# Patient Record
Sex: FEMALE | Race: WHITE | Employment: FULL TIME | URBAN - METROPOLITAN AREA
[De-identification: names, ages, dates, MRNs, and addresses within clinical notes are randomized per-mention and may not be internally consistent; named-entity substitution may affect disease eponyms.]

---

## 2017-01-26 ENCOUNTER — HOSPITAL ENCOUNTER (OUTPATIENT)
Dept: LAB | Age: 47
Discharge: HOME | End: 2017-01-26

## 2017-01-26 PROCEDURE — 88305 TISSUE EXAM BY PATHOLOGIST: CPT | Performed by: INTERNAL MEDICINE

## 2017-01-26 PROCEDURE — 88312 SPECIAL STAINS GROUP 1: CPT | Performed by: INTERNAL MEDICINE

## 2017-03-07 ENCOUNTER — HOSPITAL ENCOUNTER (OUTPATIENT)
Dept: CT IMAGING | Age: 47
Discharge: HOME | End: 2017-03-07
Attending: INTERNAL MEDICINE
Payer: COMMERCIAL

## 2017-03-07 DIAGNOSIS — R10.31 RLQ ABDOMINAL PAIN: ICD-10-CM

## 2017-03-07 PROCEDURE — 74011636320 HC RX REV CODE- 636/320: Performed by: INTERNAL MEDICINE

## 2017-03-07 PROCEDURE — 74011000258 HC RX REV CODE- 258: Performed by: INTERNAL MEDICINE

## 2017-03-07 PROCEDURE — 74177 CT ABD & PELVIS W/CONTRAST: CPT

## 2017-03-07 RX ORDER — SODIUM CHLORIDE 0.9 % (FLUSH) 0.9 %
10 SYRINGE (ML) INJECTION
Status: COMPLETED | OUTPATIENT
Start: 2017-03-07 | End: 2017-03-07

## 2017-03-07 RX ADMIN — IOVERSOL 100 ML: 741 INJECTION INTRA-ARTERIAL; INTRAVENOUS at 09:27

## 2017-03-07 RX ADMIN — SODIUM CHLORIDE 100 ML: 900 INJECTION, SOLUTION INTRAVENOUS at 09:28

## 2017-03-07 RX ADMIN — Medication 10 ML: at 09:28

## 2017-03-07 RX ADMIN — DIATRIZOATE MEGLUMINE AND DIATRIZOATE SODIUM 15 ML: 660; 100 LIQUID ORAL; RECTAL at 09:27

## 2017-07-21 ENCOUNTER — APPOINTMENT (OUTPATIENT)
Dept: CT IMAGING | Age: 47
End: 2017-07-21
Payer: COMMERCIAL

## 2017-07-21 ENCOUNTER — HOSPITAL ENCOUNTER (EMERGENCY)
Age: 47
Discharge: HOME | End: 2017-07-21
Attending: EMERGENCY MEDICINE
Payer: COMMERCIAL

## 2017-07-21 VITALS
SYSTOLIC BLOOD PRESSURE: 130 MMHG | DIASTOLIC BLOOD PRESSURE: 66 MMHG | HEIGHT: 65 IN | WEIGHT: 165 LBS | BODY MASS INDEX: 27.49 KG/M2 | TEMPERATURE: 98.5 F | HEART RATE: 65 BPM | OXYGEN SATURATION: 94 % | RESPIRATION RATE: 16 BRPM

## 2017-07-21 DIAGNOSIS — R51.9 NONINTRACTABLE HEADACHE, UNSPECIFIED CHRONICITY PATTERN, UNSPECIFIED HEADACHE TYPE: Primary | ICD-10-CM

## 2017-07-21 PROCEDURE — 99284 EMERGENCY DEPT VISIT MOD MDM: CPT | Performed by: PHYSICIAN ASSISTANT

## 2017-07-21 PROCEDURE — 74011250637 HC RX REV CODE- 250/637: Performed by: PHYSICIAN ASSISTANT

## 2017-07-21 PROCEDURE — 74011250636 HC RX REV CODE- 250/636: Performed by: PHYSICIAN ASSISTANT

## 2017-07-21 PROCEDURE — 70450 CT HEAD/BRAIN W/O DYE: CPT

## 2017-07-21 PROCEDURE — 96372 THER/PROPH/DIAG INJ SC/IM: CPT | Performed by: PHYSICIAN ASSISTANT

## 2017-07-21 RX ORDER — BUTALBITAL, ACETAMINOPHEN AND CAFFEINE 50; 325; 40 MG/1; MG/1; MG/1
1 TABLET ORAL
Status: COMPLETED | OUTPATIENT
Start: 2017-07-21 | End: 2017-07-21

## 2017-07-21 RX ORDER — ONDANSETRON 8 MG/1
8 TABLET, ORALLY DISINTEGRATING ORAL
Status: COMPLETED | OUTPATIENT
Start: 2017-07-21 | End: 2017-07-21

## 2017-07-21 RX ORDER — KETOROLAC TROMETHAMINE 30 MG/ML
60 INJECTION, SOLUTION INTRAMUSCULAR; INTRAVENOUS
Status: COMPLETED | OUTPATIENT
Start: 2017-07-21 | End: 2017-07-21

## 2017-07-21 RX ORDER — KETOROLAC TROMETHAMINE 30 MG/ML
60 INJECTION, SOLUTION INTRAMUSCULAR; INTRAVENOUS
Status: DISCONTINUED | OUTPATIENT
Start: 2017-07-21 | End: 2017-07-21 | Stop reason: HOSPADM

## 2017-07-21 RX ADMIN — BUTALBITAL, ACETAMINOPHEN AND CAFFEINE 1 TABLET: 50; 325; 40 TABLET ORAL at 13:58

## 2017-07-21 RX ADMIN — KETOROLAC TROMETHAMINE 60 MG: 30 INJECTION, SOLUTION INTRAMUSCULAR at 15:52

## 2017-07-21 RX ADMIN — ONDANSETRON 8 MG: 8 TABLET, ORALLY DISINTEGRATING ORAL at 13:59

## 2017-07-21 NOTE — LETTER
[address] [facility] EMERGENCY DEPT 
[facility] [address] [facility] [address] 
[phone] Work/School Note Date: 7/21/2017 To Whom It May concern: 
 
Redd Burciaga was seen and treated today in the emergency room by the following provider(s): 
Attending Provider: Fartun Carvajal MD 
Physician Assistant: WENDI Waite. Redd Burciaga may return to work on 7-22-17. Sincerely, WENDI Waite

## 2017-07-21 NOTE — ED PROVIDER NOTES
HPI Comments: Pt reports hedache started yesterday, no relief with advil, none taken today no feer, + nausea no vomiting or visual changes     Patient is a 48 y.o. female presenting with headaches. The history is provided by the patient. Headache    This is a new problem. The current episode started yesterday. The problem occurs constantly. The problem has not changed since onset. The headache is aggravated by an unknown factor. The pain is located in the left unilateral region. The quality of the pain is described as dull. The pain is at a severity of 6/10. The pain is mild. Associated symptoms include nausea. Pertinent negatives include no fever, no dizziness, no visual change and no vomiting. She has tried NSAIDs for the symptoms. The treatment provided no relief. Past Medical History:   Diagnosis Date    Endocrine disease     L thyroid goiter    Gastroesophageal reflux disease     Papillary carcinoma of thyroid ([facility] [address] 5/14/2013       Past Surgical History:   Procedure Laterality Date    HX HEENT      Thyroidectomy    HX WISDOM TEETH EXTRACTION           Family History:   Problem Relation Age of Onset    Heart Disease Father     Diabetes Father        Social History     Social History    Marital status: SINGLE     Spouse name: N/A    Number of children: N/A    Years of education: N/A     Occupational History    Not on file. Social History Main Topics    Smoking status: Never Smoker    Smokeless tobacco: Never Used    Alcohol use No    Drug use: No    Sexual activity: No     Other Topics Concern    Caffeine Concern Yes    Exercise Yes    Seat Belt Yes    Self-Exams Yes     Social History Narrative    Denies any sexual or physical abuse and feels safe at home. ALLERGIES: Bactrim [sulfamethoprim ds] and Sulfa (sulfonamide antibiotics)    Review of Systems   Constitutional: Negative for fever. Gastrointestinal: Positive for nausea. Negative for vomiting.    Neurological: Positive for headaches. Negative for dizziness. All other systems reviewed and are negative. Vitals:    07/21/17 1143   BP: 137/72   Pulse: 68   Resp: 16   Temp: 98.7 °F (37.1 °C)   SpO2: 97%   Weight: 74.8 kg (165 lb)   Height: 5' 5\" (1.651 m)            Physical Exam   Constitutional: She is oriented to person, place, and time. She appears well-developed and well-nourished. No distress. HENT:   Head: Normocephalic and atraumatic. Right Ear: External ear normal.   Left Ear: External ear normal.   Eyes: Conjunctivae and EOM are normal. Pupils are equal, round, and reactive to light. Neck: Normal range of motion. Neck supple. Cardiovascular: Normal rate and regular rhythm. Pulmonary/Chest: Effort normal and breath sounds normal. No respiratory distress. She has no wheezes. Abdominal: Soft. Bowel sounds are normal.   Musculoskeletal: Normal range of motion. She exhibits no edema. Neurological: She is alert and oriented to person, place, and time. She has normal reflexes. No cranial nerve deficit. No mennigeal signs    Skin: Skin is warm. Nursing note and vitals reviewed.        MDM  Number of Diagnoses or Management Options  Diagnosis management comments: Headache vs normal  Pt given fioricet and zofran   Pt walked to restroom w/o assistance then complained of increased headache  Will get head ct and recheck vss, still no neuro changes and pt appears comfortable   Head ct negative, will given toradol   Pt feeling better post toradol, stressed to follow up with pmd, work note given        Amount and/or Complexity of Data Reviewed  Tests in the radiology section of CPT®: ordered and reviewed  Review and summarize past medical records: yes    Risk of Complications, Morbidity, and/or Mortality  Presenting problems: moderate  Diagnostic procedures: moderate  Management options: low    Patient Progress  Patient progress: improved    ED Course       Procedures

## 2017-07-21 NOTE — ED TRIAGE NOTES
PMD-Dr Dhara Giles. Pt c/o headache x 2 days unrelieved with Advil. Denies any change with vision or speech. Neuro intact.

## 2017-07-21 NOTE — DISCHARGE INSTRUCTIONS

## 2017-10-24 ENCOUNTER — HOSPITAL ENCOUNTER (OUTPATIENT)
Dept: NUCLEAR MEDICINE | Age: 47
Discharge: HOME | End: 2017-10-24
Payer: COMMERCIAL

## 2017-10-24 VITALS — BODY MASS INDEX: 26.29 KG/M2 | HEIGHT: 64 IN | WEIGHT: 154 LBS

## 2017-10-24 DIAGNOSIS — R11.0 NAUSEA: ICD-10-CM

## 2017-10-24 DIAGNOSIS — R10.11 RUQ PAIN: ICD-10-CM

## 2017-10-24 PROCEDURE — 74011250636 HC RX REV CODE- 250/636

## 2017-10-24 PROCEDURE — 78227 HEPATOBIL SYST IMAGE W/DRUG: CPT

## 2017-10-24 RX ADMIN — SINCALIDE 1.4 MCG: 5 INJECTION, POWDER, LYOPHILIZED, FOR SOLUTION INTRAVENOUS at 11:11

## 2017-11-02 PROBLEM — R10.11 RIGHT UPPER QUADRANT ABDOMINAL PAIN: Status: ACTIVE | Noted: 2017-11-02

## 2017-11-09 ENCOUNTER — HOSPITAL ENCOUNTER (OUTPATIENT)
Dept: ULTRASOUND IMAGING | Age: 47
Discharge: HOME | End: 2017-11-09
Attending: SURGERY
Payer: COMMERCIAL

## 2017-11-09 DIAGNOSIS — R10.11 RIGHT UPPER QUADRANT ABDOMINAL PAIN: ICD-10-CM

## 2017-11-09 PROCEDURE — 76705 ECHO EXAM OF ABDOMEN: CPT

## 2018-07-26 PROBLEM — N93.9 MENSTRUAL BLEEDING PROBLEM: Status: ACTIVE | Noted: 2018-07-26

## 2018-07-26 PROBLEM — D25.1 INTRAMURAL, SUBMUCOUS, AND SUBSEROUS LEIOMYOMA OF UTERUS: Status: ACTIVE | Noted: 2018-07-26

## 2018-07-26 PROBLEM — D25.0 INTRAMURAL, SUBMUCOUS, AND SUBSEROUS LEIOMYOMA OF UTERUS: Status: ACTIVE | Noted: 2018-07-26

## 2018-07-26 PROBLEM — B37.31 YEAST VAGINITIS: Status: ACTIVE | Noted: 2018-07-26

## 2018-07-26 PROBLEM — D25.2 INTRAMURAL, SUBMUCOUS, AND SUBSEROUS LEIOMYOMA OF UTERUS: Status: ACTIVE | Noted: 2018-07-26

## 2018-07-26 PROBLEM — N93.9 ABNORMAL UTERINE BLEEDING: Status: ACTIVE | Noted: 2018-07-26

## 2018-07-26 PROCEDURE — 88305 TISSUE EXAM BY PATHOLOGIST: CPT | Performed by: OBSTETRICS & GYNECOLOGY

## 2018-07-27 ENCOUNTER — HOSPITAL ENCOUNTER (OUTPATIENT)
Dept: LAB | Age: 48
Discharge: HOME | End: 2018-07-27

## 2018-10-01 PROBLEM — N39.46 MIXED INCONTINENCE: Status: ACTIVE | Noted: 2018-10-01

## 2018-11-09 ENCOUNTER — HOSPITAL ENCOUNTER (OUTPATIENT)
Dept: SURGERY | Age: 48
Discharge: HOME | End: 2018-11-09

## 2018-11-15 VITALS — HEIGHT: 65 IN | BODY MASS INDEX: 24.83 KG/M2 | WEIGHT: 149 LBS

## 2018-11-15 NOTE — PERIOP NOTES
Patient verified name and     Order for consent not found in EHR. Pt states that Dr. Carlo Awad is not doing surgery any longer and only Dr. Debora Huynh will be doing a hysterectomy. Julita Later in scheduling notified that patient states  Carlo Awad is not doing surgery. Pt also states that she is having a total hysterectomy with bilateral salpingectomy and oophorectomy, surgery posting is for hysterectomy with salpingectomy. Dell Franco in surgery scheduling at Dr. Emery Chacko office notified that pt states she is having a hysterectomy with bilateral salpingectomy and oophorectomy. Type 2 surgery    Labs per surgeon: orders not received. Labs per anesthesia protocol: Hgb and Type & screen the day of surgery. EKG: None needed per anesthesia guidelines. Patient informed of GYN class on 18 at 11:00 AM at which time labs will be drawn. Patient will also receive all patient education and hibiclens soap to use per hospital policy. Patient instructed to hold all vitamins 7 days prior to surgery and NSAIDS 5 days prior to surgery, patient verbalized understanding. Patient instructed to continue previous medications as prescribed prior to surgery and to take the following medications the day of surgery according to anesthesia guidelines with a small sip of water: WP thyroid and zegrid if needed. Patient answered medical/surgical history questions at their best of ability. All prior to admission medications documented in [facility].

## 2018-11-16 ENCOUNTER — HOSPITAL ENCOUNTER (OUTPATIENT)
Dept: SURGERY | Age: 48
Discharge: HOME | End: 2018-11-16
Payer: COMMERCIAL

## 2018-11-16 LAB — HGB BLD-MCNC: 12.9 G/DL (ref 11.7–15.4)

## 2018-11-16 PROCEDURE — 85018 HEMOGLOBIN: CPT

## 2018-11-16 PROCEDURE — 36415 COLL VENOUS BLD VENIPUNCTURE: CPT

## 2018-11-16 NOTE — PERIOP NOTES
HGB done today WNL Recent Results (from the past 12 hour(s)) HEMOGLOBIN Collection Time: 11/16/18 11:55 AM  
Result Value Ref Range HGB 12.9 11.7 - 15.4 g/dL

## 2018-11-16 NOTE — PROGRESS NOTES
Patient attended GYN surgery orientation class today. Detailed instruction book regarding GYN surgery was provided at start of class. Class content included pre-operative instructions for surgery in the week prior to and day before surgery. Packet including Hibiclens and instructions on bathing was provided to patient. Detailed information was given regarding arriving at the hospital and instructions for the patient's day of surgery. Discussed recovery from surgery, hospital stay, pain management, and discharge. Reviewed recovery at home including pelvic rest, driving and activity restrictions, issues requiring call to physician etc. Freda Stephen all questions in detail. Patient voices understanding of all.

## 2018-11-26 ENCOUNTER — ANESTHESIA EVENT (OUTPATIENT)
Dept: SURGERY | Age: 48
End: 2018-11-26
Payer: COMMERCIAL

## 2018-11-27 ENCOUNTER — ANESTHESIA (OUTPATIENT)
Dept: SURGERY | Age: 48
End: 2018-11-27
Payer: COMMERCIAL

## 2018-11-27 ENCOUNTER — HOSPITAL ENCOUNTER (OUTPATIENT)
Age: 48
Setting detail: OBSERVATION
Discharge: HOME | End: 2018-11-28
Attending: OBSTETRICS & GYNECOLOGY | Admitting: OBSTETRICS & GYNECOLOGY
Payer: COMMERCIAL

## 2018-11-27 DIAGNOSIS — G89.18 POSTOPERATIVE PAIN: ICD-10-CM

## 2018-11-27 DIAGNOSIS — N93.9 ABNORMAL UTERINE BLEEDING: Primary | ICD-10-CM

## 2018-11-27 LAB
ABO + RH BLD: NORMAL
BLOOD GROUP ANTIBODIES SERPL: NORMAL
HCG UR QL: NEGATIVE
SPECIMEN EXP DATE BLD: NORMAL

## 2018-11-27 PROCEDURE — 77030020255 HC SOL INJ LR 1000ML BG: Performed by: OBSTETRICS & GYNECOLOGY

## 2018-11-27 PROCEDURE — 81025 URINE PREGNANCY TEST: CPT

## 2018-11-27 PROCEDURE — 74011250636 HC RX REV CODE- 250/636

## 2018-11-27 PROCEDURE — 36415 COLL VENOUS BLD VENIPUNCTURE: CPT

## 2018-11-27 PROCEDURE — 99218 HC RM OBSERVATION: CPT

## 2018-11-27 PROCEDURE — 77030008756 HC TU IRR SUC STRY -B: Performed by: OBSTETRICS & GYNECOLOGY

## 2018-11-27 PROCEDURE — 74011250637 HC RX REV CODE- 250/637: Performed by: ANESTHESIOLOGY

## 2018-11-27 PROCEDURE — 77030016151 HC PROTCTR LNS DFOG COVD -B: Performed by: OBSTETRICS & GYNECOLOGY

## 2018-11-27 PROCEDURE — 76060000034 HC ANESTHESIA 1.5 TO 2 HR: Performed by: OBSTETRICS & GYNECOLOGY

## 2018-11-27 PROCEDURE — 77030031139 HC SUT VCRL2 J&J -A: Performed by: OBSTETRICS & GYNECOLOGY

## 2018-11-27 PROCEDURE — 74011250636 HC RX REV CODE- 250/636: Performed by: OBSTETRICS & GYNECOLOGY

## 2018-11-27 PROCEDURE — 76010000875 HC OR TIME 1.5 TO 2HR INTENSV - TIER 2: Performed by: OBSTETRICS & GYNECOLOGY

## 2018-11-27 PROCEDURE — 77030037088 HC TUBE ENDOTRACH ORAL NSL COVD-A: Performed by: ANESTHESIOLOGY

## 2018-11-27 PROCEDURE — 74011000250 HC RX REV CODE- 250: Performed by: OBSTETRICS & GYNECOLOGY

## 2018-11-27 PROCEDURE — 77030039425 HC BLD LARYNG TRULITE DISP TELE -A: Performed by: ANESTHESIOLOGY

## 2018-11-27 PROCEDURE — 74011250636 HC RX REV CODE- 250/636: Performed by: ANESTHESIOLOGY

## 2018-11-27 PROCEDURE — 77030018846 HC SOL IRR STRL H20 ICUM -A: Performed by: OBSTETRICS & GYNECOLOGY

## 2018-11-27 PROCEDURE — 77030031492 HC PRT ACC BLNT AIRSEAL CNMD -B: Performed by: OBSTETRICS & GYNECOLOGY

## 2018-11-27 PROCEDURE — 77030010517 HC APPL SEAL FLOSEL BAXT -B: Performed by: OBSTETRICS & GYNECOLOGY

## 2018-11-27 PROCEDURE — 77030034696 HC CATH URETH FOL 2W BARD -A: Performed by: OBSTETRICS & GYNECOLOGY

## 2018-11-27 PROCEDURE — 86901 BLOOD TYPING SEROLOGIC RH(D): CPT

## 2018-11-27 PROCEDURE — 74011000250 HC RX REV CODE- 250

## 2018-11-27 PROCEDURE — 76210000006 HC OR PH I REC 0.5 TO 1 HR: Performed by: OBSTETRICS & GYNECOLOGY

## 2018-11-27 PROCEDURE — 88307 TISSUE EXAM BY PATHOLOGIST: CPT

## 2018-11-27 PROCEDURE — 77030035044 HC TRCR ENDOSC VRSPRT BLDLSS COVD -C: Performed by: OBSTETRICS & GYNECOLOGY

## 2018-11-27 PROCEDURE — 77030035277 HC OBTRTR BLDELSS DISP INTU -B: Performed by: OBSTETRICS & GYNECOLOGY

## 2018-11-27 PROCEDURE — 74011250637 HC RX REV CODE- 250/637: Performed by: OBSTETRICS & GYNECOLOGY

## 2018-11-27 PROCEDURE — 77030008771 HC TU NG SALEM SUMP -A: Performed by: ANESTHESIOLOGY

## 2018-11-27 PROCEDURE — 77030010545: Performed by: OBSTETRICS & GYNECOLOGY

## 2018-11-27 PROCEDURE — 77030018778 HC MANIP UTER VCAR CNMD -B: Performed by: OBSTETRICS & GYNECOLOGY

## 2018-11-27 PROCEDURE — 77030020782 HC GWN BAIR PAWS FLX 3M -B: Performed by: ANESTHESIOLOGY

## 2018-11-27 PROCEDURE — 77030014650 HC SEAL MTRX FLOSEL BAXT -C: Performed by: OBSTETRICS & GYNECOLOGY

## 2018-11-27 PROCEDURE — 77030035029 HC NDL INSUF VERES DISP COVD -B: Performed by: OBSTETRICS & GYNECOLOGY

## 2018-11-27 PROCEDURE — 77030032490 HC SLV COMPR SCD KNE COVD -B: Performed by: OBSTETRICS & GYNECOLOGY

## 2018-11-27 RX ORDER — SODIUM CHLORIDE 0.9 % (FLUSH) 0.9 %
5-10 SYRINGE (ML) INJECTION EVERY 8 HOURS
Status: DISCONTINUED | OUTPATIENT
Start: 2018-11-27 | End: 2018-11-28 | Stop reason: HOSPADM

## 2018-11-27 RX ORDER — FENTANYL CITRATE 50 UG/ML
INJECTION, SOLUTION INTRAMUSCULAR; INTRAVENOUS AS NEEDED
Status: DISCONTINUED | OUTPATIENT
Start: 2018-11-27 | End: 2018-11-27 | Stop reason: HOSPADM

## 2018-11-27 RX ORDER — PROPOFOL 10 MG/ML
INJECTION, EMULSION INTRAVENOUS AS NEEDED
Status: DISCONTINUED | OUTPATIENT
Start: 2018-11-27 | End: 2018-11-27 | Stop reason: HOSPADM

## 2018-11-27 RX ORDER — LORAZEPAM 1 MG/1
1 TABLET ORAL
Status: DISCONTINUED | OUTPATIENT
Start: 2018-11-27 | End: 2018-11-28 | Stop reason: HOSPADM

## 2018-11-27 RX ORDER — GLYCOPYRROLATE 0.2 MG/ML
INJECTION INTRAMUSCULAR; INTRAVENOUS AS NEEDED
Status: DISCONTINUED | OUTPATIENT
Start: 2018-11-27 | End: 2018-11-27 | Stop reason: HOSPADM

## 2018-11-27 RX ORDER — ROCURONIUM BROMIDE 10 MG/ML
INJECTION, SOLUTION INTRAVENOUS AS NEEDED
Status: DISCONTINUED | OUTPATIENT
Start: 2018-11-27 | End: 2018-11-27 | Stop reason: HOSPADM

## 2018-11-27 RX ORDER — KETOROLAC TROMETHAMINE 30 MG/ML
30 INJECTION, SOLUTION INTRAMUSCULAR; INTRAVENOUS
Status: DISCONTINUED | OUTPATIENT
Start: 2018-11-27 | End: 2018-11-28 | Stop reason: HOSPADM

## 2018-11-27 RX ORDER — DOCUSATE SODIUM 100 MG/1
100 CAPSULE, LIQUID FILLED ORAL 2 TIMES DAILY
Status: DISCONTINUED | OUTPATIENT
Start: 2018-11-27 | End: 2018-11-28 | Stop reason: HOSPADM

## 2018-11-27 RX ORDER — KETOROLAC TROMETHAMINE 30 MG/ML
INJECTION, SOLUTION INTRAMUSCULAR; INTRAVENOUS AS NEEDED
Status: DISCONTINUED | OUTPATIENT
Start: 2018-11-27 | End: 2018-11-27 | Stop reason: HOSPADM

## 2018-11-27 RX ORDER — OXYCODONE AND ACETAMINOPHEN 5; 325 MG/1; MG/1
1 TABLET ORAL
Status: DISCONTINUED | OUTPATIENT
Start: 2018-11-27 | End: 2018-11-28 | Stop reason: HOSPADM

## 2018-11-27 RX ORDER — SODIUM CHLORIDE, SODIUM LACTATE, POTASSIUM CHLORIDE, CALCIUM CHLORIDE 600; 310; 30; 20 MG/100ML; MG/100ML; MG/100ML; MG/100ML
75 INJECTION, SOLUTION INTRAVENOUS CONTINUOUS
Status: DISCONTINUED | OUTPATIENT
Start: 2018-11-27 | End: 2018-11-27 | Stop reason: HOSPADM

## 2018-11-27 RX ORDER — ONDANSETRON 2 MG/ML
INJECTION INTRAMUSCULAR; INTRAVENOUS AS NEEDED
Status: DISCONTINUED | OUTPATIENT
Start: 2018-11-27 | End: 2018-11-27 | Stop reason: HOSPADM

## 2018-11-27 RX ORDER — DEXAMETHASONE SODIUM PHOSPHATE 4 MG/ML
INJECTION, SOLUTION INTRA-ARTICULAR; INTRALESIONAL; INTRAMUSCULAR; INTRAVENOUS; SOFT TISSUE AS NEEDED
Status: DISCONTINUED | OUTPATIENT
Start: 2018-11-27 | End: 2018-11-27 | Stop reason: HOSPADM

## 2018-11-27 RX ORDER — HYDROCODONE BITARTRATE AND ACETAMINOPHEN 5; 325 MG/1; MG/1
2 TABLET ORAL AS NEEDED
Status: DISCONTINUED | OUTPATIENT
Start: 2018-11-27 | End: 2018-11-27 | Stop reason: HOSPADM

## 2018-11-27 RX ORDER — BUPIVACAINE HYDROCHLORIDE 5 MG/ML
INJECTION, SOLUTION EPIDURAL; INTRACAUDAL AS NEEDED
Status: DISCONTINUED | OUTPATIENT
Start: 2018-11-27 | End: 2018-11-27 | Stop reason: HOSPADM

## 2018-11-27 RX ORDER — OXYCODONE HYDROCHLORIDE 5 MG/1
5 TABLET ORAL
Status: COMPLETED | OUTPATIENT
Start: 2018-11-27 | End: 2018-11-27

## 2018-11-27 RX ORDER — LIDOCAINE HYDROCHLORIDE 20 MG/ML
INJECTION, SOLUTION EPIDURAL; INFILTRATION; INTRACAUDAL; PERINEURAL AS NEEDED
Status: DISCONTINUED | OUTPATIENT
Start: 2018-11-27 | End: 2018-11-27 | Stop reason: HOSPADM

## 2018-11-27 RX ORDER — NEOSTIGMINE METHYLSULFATE 1 MG/ML
INJECTION INTRAVENOUS AS NEEDED
Status: DISCONTINUED | OUTPATIENT
Start: 2018-11-27 | End: 2018-11-27 | Stop reason: HOSPADM

## 2018-11-27 RX ORDER — HYDROMORPHONE HYDROCHLORIDE 2 MG/ML
0.5 INJECTION, SOLUTION INTRAMUSCULAR; INTRAVENOUS; SUBCUTANEOUS
Status: DISCONTINUED | OUTPATIENT
Start: 2018-11-27 | End: 2018-11-27 | Stop reason: HOSPADM

## 2018-11-27 RX ORDER — SCOLOPAMINE TRANSDERMAL SYSTEM 1 MG/1
1 PATCH, EXTENDED RELEASE TRANSDERMAL
Status: DISCONTINUED | OUTPATIENT
Start: 2018-11-27 | End: 2018-11-28 | Stop reason: HOSPADM

## 2018-11-27 RX ORDER — CEFAZOLIN SODIUM/WATER 2 G/20 ML
2 SYRINGE (ML) INTRAVENOUS ONCE
Status: COMPLETED | OUTPATIENT
Start: 2018-11-27 | End: 2018-11-27

## 2018-11-27 RX ORDER — SODIUM CHLORIDE 0.9 % (FLUSH) 0.9 %
5-10 SYRINGE (ML) INJECTION AS NEEDED
Status: DISCONTINUED | OUTPATIENT
Start: 2018-11-27 | End: 2018-11-28 | Stop reason: HOSPADM

## 2018-11-27 RX ORDER — ATROPA BELLADONNA AND OPIUM 16.2; 6 MG/1; MG/1
SUPPOSITORY RECTAL AS NEEDED
Status: DISCONTINUED | OUTPATIENT
Start: 2018-11-27 | End: 2018-11-27 | Stop reason: HOSPADM

## 2018-11-27 RX ORDER — HYDROMORPHONE HYDROCHLORIDE 2 MG/ML
1 INJECTION, SOLUTION INTRAMUSCULAR; INTRAVENOUS; SUBCUTANEOUS
Status: DISCONTINUED | OUTPATIENT
Start: 2018-11-27 | End: 2018-11-28 | Stop reason: HOSPADM

## 2018-11-27 RX ORDER — MIDAZOLAM HYDROCHLORIDE 1 MG/ML
2 INJECTION, SOLUTION INTRAMUSCULAR; INTRAVENOUS
Status: COMPLETED | OUTPATIENT
Start: 2018-11-27 | End: 2018-11-27

## 2018-11-27 RX ORDER — EPHEDRINE SULFATE 50 MG/ML
INJECTION, SOLUTION INTRAVENOUS AS NEEDED
Status: DISCONTINUED | OUTPATIENT
Start: 2018-11-27 | End: 2018-11-27 | Stop reason: HOSPADM

## 2018-11-27 RX ORDER — DIPHENHYDRAMINE HYDROCHLORIDE 50 MG/ML
12.5 INJECTION, SOLUTION INTRAMUSCULAR; INTRAVENOUS
Status: DISCONTINUED | OUTPATIENT
Start: 2018-11-27 | End: 2018-11-28 | Stop reason: HOSPADM

## 2018-11-27 RX ORDER — FENTANYL CITRATE 50 UG/ML
100 INJECTION, SOLUTION INTRAMUSCULAR; INTRAVENOUS ONCE
Status: DISCONTINUED | OUTPATIENT
Start: 2018-11-27 | End: 2018-11-27 | Stop reason: HOSPADM

## 2018-11-27 RX ORDER — LIDOCAINE HYDROCHLORIDE 10 MG/ML
0.1 INJECTION INFILTRATION; PERINEURAL AS NEEDED
Status: DISCONTINUED | OUTPATIENT
Start: 2018-11-27 | End: 2018-11-27 | Stop reason: HOSPADM

## 2018-11-27 RX ADMIN — DOCUSATE SODIUM 100 MG: 100 CAPSULE, LIQUID FILLED ORAL at 17:46

## 2018-11-27 RX ADMIN — FENTANYL CITRATE 50 MCG: 50 INJECTION, SOLUTION INTRAMUSCULAR; INTRAVENOUS at 07:43

## 2018-11-27 RX ADMIN — OXYCODONE AND ACETAMINOPHEN 1 TABLET: 5; 325 TABLET ORAL at 17:46

## 2018-11-27 RX ADMIN — DEXAMETHASONE SODIUM PHOSPHATE 10 MG: 4 INJECTION, SOLUTION INTRA-ARTICULAR; INTRALESIONAL; INTRAMUSCULAR; INTRAVENOUS; SOFT TISSUE at 07:54

## 2018-11-27 RX ADMIN — Medication 10 ML: at 14:00

## 2018-11-27 RX ADMIN — GLYCOPYRROLATE 0.4 MG: 0.2 INJECTION INTRAMUSCULAR; INTRAVENOUS at 08:55

## 2018-11-27 RX ADMIN — FENTANYL CITRATE 50 MCG: 50 INJECTION, SOLUTION INTRAMUSCULAR; INTRAVENOUS at 08:00

## 2018-11-27 RX ADMIN — HYDROMORPHONE HYDROCHLORIDE 1 MG: 2 INJECTION, SOLUTION INTRAMUSCULAR; INTRAVENOUS; SUBCUTANEOUS at 20:50

## 2018-11-27 RX ADMIN — PROPOFOL 200 MG: 10 INJECTION, EMULSION INTRAVENOUS at 07:43

## 2018-11-27 RX ADMIN — ROCURONIUM BROMIDE 40 MG: 10 INJECTION, SOLUTION INTRAVENOUS at 07:43

## 2018-11-27 RX ADMIN — Medication 5 ML: at 20:53

## 2018-11-27 RX ADMIN — OXYCODONE HYDROCHLORIDE 5 MG: 5 TABLET ORAL at 11:30

## 2018-11-27 RX ADMIN — KETOROLAC TROMETHAMINE 30 MG: 30 INJECTION, SOLUTION INTRAMUSCULAR; INTRAVENOUS at 08:50

## 2018-11-27 RX ADMIN — HYDROMORPHONE HYDROCHLORIDE 0.5 MG: 2 INJECTION, SOLUTION INTRAMUSCULAR; INTRAVENOUS; SUBCUTANEOUS at 09:35

## 2018-11-27 RX ADMIN — SODIUM CHLORIDE, SODIUM LACTATE, POTASSIUM CHLORIDE, AND CALCIUM CHLORIDE: 600; 310; 30; 20 INJECTION, SOLUTION INTRAVENOUS at 08:45

## 2018-11-27 RX ADMIN — HYDROMORPHONE HYDROCHLORIDE 0.5 MG: 2 INJECTION, SOLUTION INTRAMUSCULAR; INTRAVENOUS; SUBCUTANEOUS at 09:30

## 2018-11-27 RX ADMIN — HYDROMORPHONE HYDROCHLORIDE 0.5 MG: 2 INJECTION, SOLUTION INTRAMUSCULAR; INTRAVENOUS; SUBCUTANEOUS at 09:20

## 2018-11-27 RX ADMIN — EPHEDRINE SULFATE 10 MG: 50 INJECTION, SOLUTION INTRAVENOUS at 07:51

## 2018-11-27 RX ADMIN — SODIUM CHLORIDE, SODIUM LACTATE, POTASSIUM CHLORIDE, AND CALCIUM CHLORIDE 75 ML/HR: 600; 310; 30; 20 INJECTION, SOLUTION INTRAVENOUS at 07:00

## 2018-11-27 RX ADMIN — DOCUSATE SODIUM 100 MG: 100 CAPSULE, LIQUID FILLED ORAL at 13:58

## 2018-11-27 RX ADMIN — ONDANSETRON 4 MG: 2 INJECTION INTRAMUSCULAR; INTRAVENOUS at 08:39

## 2018-11-27 RX ADMIN — NEOSTIGMINE METHYLSULFATE 3 MG: 1 INJECTION INTRAVENOUS at 08:55

## 2018-11-27 RX ADMIN — MIDAZOLAM 2 MG: 1 INJECTION INTRAMUSCULAR; INTRAVENOUS at 07:17

## 2018-11-27 RX ADMIN — Medication 2 G: at 06:36

## 2018-11-27 RX ADMIN — HYDROMORPHONE HYDROCHLORIDE 1 MG: 2 INJECTION, SOLUTION INTRAMUSCULAR; INTRAVENOUS; SUBCUTANEOUS at 15:15

## 2018-11-27 RX ADMIN — LIDOCAINE HYDROCHLORIDE 100 MG: 20 INJECTION, SOLUTION EPIDURAL; INFILTRATION; INTRACAUDAL; PERINEURAL at 07:43

## 2018-11-27 NOTE — PROGRESS NOTES
Problem: Falls - Risk of 
Goal: *Absence of Falls Document Richa Evens Fall Risk and appropriate interventions in the flowsheet. Outcome: Progressing Towards Goal 
Fall Risk Interventions: 
  
 
  
 
Medication Interventions: Bed/chair exit alarm

## 2018-11-27 NOTE — ANESTHESIA POSTPROCEDURE EVALUATION
Procedure(s): HYSTERECTOMY ROBOTIC ASSISTED WITH BILATERAL SALPINGO-OOPHORECTOMY. Anesthesia Post Evaluation Multimodal analgesia: multimodal analgesia not used between 6 hours prior to anesthesia start to PACU discharge Patient location during evaluation: bedside Patient participation: complete - patient participated Level of consciousness: awake and alert Pain score: 3 Pain management: adequate Airway patency: patent Anesthetic complications: no 
Cardiovascular status: acceptable and hemodynamically stable Respiratory status: acceptable Hydration status: acceptable Visit Vitals /55 (BP 1 Location: Right arm, BP Patient Position: At rest) Pulse 75 Temp 36.4 °C (97.5 °F) Resp 12 Wt 69.3 kg (152 lb 12.8 oz) SpO2 98% BMI 25.82 kg/m²

## 2018-11-27 NOTE — OP NOTES
ROBOTICS OPERATIVE REPORT    NAME: Karen Fernandez    DATE OF SURGERY: 11/27/2018    Pre-Op Diagnosis: Abnormal uterine bleeding (AUB) [N93.9]  Uterine leiomyoma, unspecified location [D25.9]  Pelvic pain in female [R10.2]    Post-Op Diagnosis: Abnormal uterine bleeding (AUB) [N93.9]  Uterine leiomyoma, unspecified location [D25.9]  Pelvic pain in female [R10.2]      Procedure: Procedure(s): HYSTERECTOMY ROBOTIC ASSISTED WITH BILATERAL SALPINGO-OOPHORECTOMY    Surgeon: Homar Boland MD    Anesthesia: General     Estimated Blood Loss: 50cc    Specimens:   ID Type Source Tests Collected by Time Destination   1 : Uterus with bilateral tubes and ovaries Preservative Uterus with Bilateral Fallopian tubes and Ovaries  Adrienne Langston MD 11/27/2018 0820 Pathology        Complications: none    DRAINS: Ledezma catheter    FINDINGS: uterine fibroids     DESCRIPTION: After an adequate level of anesthesia was  obtained, the patient was prepped and draped in the usual sterile fashion  in the dorsal lithotomy position. A weighted speculum was placed in the  anterior aspect and the cervix was grasped with a tenaculum. The uterus  was sounded to a depth of 9 cm. The V care was placed and cup was sutured  to the cervix. The infraumbilical incision was made and the Veress  needle inserted insufflating the peritoneal cavity with CO2. The trocar  was placed. Under visualization two lateral ports were placed on both  sides. The robot was then attached to the trocars. After instruments were placed under visualization and went to the  console The right and then left infundibulopelvic ligament was coagulated and cut. The right and then left  broad ligament was coagulated and cut. The bladder flap was  developed on the right side and furthered on the left. The round ligament was coagulated and cut bilaterally . The  bladder flap was continued to be developed.  The uterine vessels were then skeletonized and coagulated and cut. The cardinal ligaments were   taken down to the Vcare ring  and this was circumscribed anteriorly and  posteriorly. Hemostasis was noted. The uterus, tubes and ovaries wereremoved. The vaginal cuff was closed with 0 Vicryl in a running fashion  and with securing uterosacral ligaments  on each side and closed midline. The area was irrigated free of blood clot and debris. Hemostasis was  noted at all points. The robot was detached. I again scrubbed, removed  the trocars and closed. The patient tolerated the procedure well and went to the  recovery room in good condition.           Isabel Contreras MD A

## 2018-11-27 NOTE — PROGRESS NOTES
11/27/18 1312 Dual Skin Pressure Injury Assessment Dual Skin Pressure Injury Assessment WDL 
(4 lap sites to abd c/d/i) Second Care Provider (Based on Facility Policy) Demarcus Katz RN

## 2018-11-27 NOTE — PROGRESS NOTES
TRANSFER - IN REPORT: 
 
Verbal report received from [address] on [address]  being received from PACU for routine post - op Report consisted of patients Situation, Background, Assessment and  
Recommendations(SBAR). Information from the following report(s) SBAR, Kardex, OR Summary, Intake/Output, MAR and Recent Results was reviewed with the receiving nurse. Opportunity for questions and clarification was provided. Assessment completed upon patients arrival to unit and care assumed.

## 2018-11-27 NOTE — PERIOP NOTES
TRANSFER - OUT REPORT: 
 
Verbal report given to Heidy Taylor RN (name) on Tino Gibbs  being transferred to room 344 (unit) for routine post - op Report consisted of patients Situation, Background, Assessment and  
Recommendations(SBAR). Information from the following report(s) SBAR, Kardex, Procedure Summary, Intake/Output and MAR was reviewed with the receiving nurse. Lines:  
Peripheral IV 11/27/18 Left Wrist (Active) Site Assessment Clean, dry, & intact 11/27/2018  9:58 AM  
Phlebitis Assessment 0 11/27/2018  9:58 AM  
Infiltration Assessment 0 11/27/2018  9:58 AM  
Dressing Status Clean, dry, & intact 11/27/2018  9:58 AM  
Hub Color/Line Status Green; Infusing 11/27/2018  9:58 AM  
  
 
Opportunity for questions and clarification was provided.    
 
Patient transported with: 
 [facility]

## 2018-11-27 NOTE — PROGRESS NOTES
Admission assessment complete. Pt resting in bed. A&O x4. Respirations present, even, unlabored. Lung sounds clear to auscultation. HR regular, S1&S2 auscultated. IVF infusing without difficulty. 4 lap sites to the abdomen with guaze and tegaderm, clean, dry, and intact. Bilateral SCDs in place. Instructed use and demonstrated use of incentive spirometer. Pt verbalized understanding. Pt states pain at a 3 but does not need anything for pain right now. Encouraged to call for help. Bed in lowest position, call light within reach, side rails x3. Will continue to monitor throughout the shift.

## 2018-11-27 NOTE — ANESTHESIA PREPROCEDURE EVALUATION
Anesthetic History PONV Review of Systems / Medical History Patient summary reviewed and pertinent labs reviewed Pulmonary Within defined limits Neuro/Psych Within defined limits Cardiovascular Within defined limits Exercise tolerance: >4 METS 
  
GI/Hepatic/Renal 
  
GERD: well controlled Endo/Other Hypothyroidism: well controlled Other Findings Physical Exam 
 
Airway Mallampati: II 
TM Distance: 4 - 6 cm Neck ROM: normal range of motion Mouth opening: Normal 
 
 Cardiovascular Regular rate and rhythm,  S1 and S2 normal,  no murmur, click, rub, or gallop Dental 
 
 
  
Pulmonary Breath sounds clear to auscultation Abdominal 
 
 
 
 Other Findings Anesthetic Plan ASA: 2 Anesthesia type: general 
 
 
 
 
Induction: Intravenous Anesthetic plan and risks discussed with: Patient and Family

## 2018-11-27 NOTE — H&P
Subjective:  
 
Patient is a 46 y.o.  female presents with pelvic pain heavy uterine bleeding. . gradually worsening course. Patient Active Problem List  
 Diagnosis Date Noted  Mixed incontinence 10/01/2018  Abnormal uterine bleeding 07/26/2018  Yeast vaginitis 07/26/2018  Intramural, submucous, and subserous leiomyoma of uterus 07/26/2018  Menstrual bleeding problem 07/26/2018  Right upper quadrant abdominal pain 11/02/2017  Ventricular premature beats 12/01/2016  Chronic right shoulder pain 12/01/2016  Neck pain 12/01/2016  Gastroesophageal reflux disease  Disease of thyroid gland  Arthralgia of temporomandibular joint 05/15/2013  Acquired hypothyroidism 05/14/2013  History of thyroidectomy 05/14/2013  Papillary carcinoma of thyroid ([address] 05/14/2013 Past Medical History:  
Diagnosis Date  Adverse effect of anesthesia Difficulty awakening with one surgery  Arthritis  Endocrine disease   
 thyroidectomy  Fibroid  Gastroesophageal reflux disease Managed with PRN meds  Nausea & vomiting  Papillary carcinoma of thyroid ([address] 5/14/2013  PVC (premature ventricular contraction)  Yeast vaginitis 7/26/2018 Past Surgical History:  
Procedure Laterality Date  HX HEENT Thyroidectomy  HX WISDOM TEETH EXTRACTION    
  
[unfilled] Allergies Allergen Reactions  Bactrim [Sulfamethoprim Ds] Vertigo  Sulfa (Sulfonamide Antibiotics) Other (comments)  
  dizzy Social History Tobacco Use  Smoking status: Never Smoker  Smokeless tobacco: Never Used Substance Use Topics  Alcohol use: No  
  
Family History Problem Relation Age of Onset  Heart Disease Father  Diabetes Father  No Known Problems Mother Review of Systems A comprehensive review of systems was negative except for that written in the HPI. Objective:  
 
Patient Vitals for the past 8 hrs: BP Temp Pulse Resp SpO2 Weight 11/27/18 0610 148/86 98.3 °F (36.8 °C) 79 17 97 % 152 lb 12.8 oz (69.3 kg) No intake or output data in the 24 hours ending 11/27/18 0729 General: Alert . Oriented x3 HEENT: Normocephalic PEERL Heart: RRR Lungs: Clear Abdominal: Bowel sounds are normal, liver is not enlarged, spleen is not enlarged Neurological: Alert and oriented X 3, normal strength and tone. Normal symmetric reflexes. Normal coordination and gait Extremities: extremities normal, atraumatic, no cyanosis or edema Assessment:  
 
 
Abnormal uterine bleeding Pelvic pain Plan:  
 
 
Procedure(s): HYSTERECTOMY ROBOTIC ASSISTED WITH BILATERAL SALPINGO-OOPHORECTOMY

## 2018-11-28 VITALS
SYSTOLIC BLOOD PRESSURE: 98 MMHG | BODY MASS INDEX: 25.82 KG/M2 | TEMPERATURE: 97.8 F | OXYGEN SATURATION: 95 % | HEART RATE: 64 BPM | RESPIRATION RATE: 16 BRPM | WEIGHT: 152.8 LBS | DIASTOLIC BLOOD PRESSURE: 59 MMHG

## 2018-11-28 PROCEDURE — 74011250637 HC RX REV CODE- 250/637: Performed by: OBSTETRICS & GYNECOLOGY

## 2018-11-28 PROCEDURE — 99218 HC RM OBSERVATION: CPT

## 2018-11-28 PROCEDURE — 74011250636 HC RX REV CODE- 250/636: Performed by: OBSTETRICS & GYNECOLOGY

## 2018-11-28 RX ORDER — HYDROMORPHONE HYDROCHLORIDE 2 MG/1
2 TABLET ORAL
Qty: 12 TAB | Refills: 0 | Status: SHIPPED | OUTPATIENT
Start: 2018-11-28 | End: 2018-12-21

## 2018-11-28 RX ORDER — ESTRADIOL 0.05 MG/D
1 FILM, EXTENDED RELEASE TRANSDERMAL
Qty: 8 PATCH | Refills: 11 | Status: SHIPPED | OUTPATIENT
Start: 2018-11-29 | End: 2019-07-11

## 2018-11-28 RX ADMIN — Medication 10 ML: at 04:49

## 2018-11-28 RX ADMIN — DOCUSATE SODIUM 100 MG: 100 CAPSULE, LIQUID FILLED ORAL at 07:50

## 2018-11-28 RX ADMIN — HYDROMORPHONE HYDROCHLORIDE 1 MG: 2 INJECTION, SOLUTION INTRAMUSCULAR; INTRAVENOUS; SUBCUTANEOUS at 04:49

## 2018-11-28 RX ADMIN — OXYCODONE AND ACETAMINOPHEN 1 TABLET: 5; 325 TABLET ORAL at 09:26

## 2018-11-28 NOTE — DISCHARGE INSTRUCTIONS
DISCHARGE SUMMARY from Nurse    PATIENT INSTRUCTIONS:    After general anesthesia or intravenous sedation, for 24 hours or while taking prescription Narcotics:  · Limit your activities  · Do not drive and operate hazardous machinery  · Do not make important personal or business decisions  · Do  not drink alcoholic beverages  · If you have not urinated within 8 hours after discharge, please contact your surgeon on call. Report the following to your surgeon:  · Excessive pain, swelling, redness or odor of or around the surgical area  · Temperature over 100.5  · Nausea and vomiting lasting longer than 4 hours or if unable to take medications  · Any signs of decreased circulation or nerve impairment to extremity: change in color, persistent  numbness, tingling, coldness or increase pain  · Any questions    What to do at Home:  Recommended activity: Activity as tolerated, No driving while on analgesics and No heavy lifting for 6 weeks, pelvic rest for 6 weeks or as advised by MD    If you experience any of the following symptoms severe abdominal pain, persistent nausea/vomiting, fever or other s/s of infection, heavy vaginal bleeding, please follow up with surgeon asap. *  Please give a list of your current medications to your Primary Care Provider. *  Please update this list whenever your medications are discontinued, doses are      changed, or new medications (including over-the-counter products) are added. *  Please carry medication information at all times in case of emergency situations. These are general instructions for a healthy lifestyle:    No smoking/ No tobacco products/ Avoid exposure to second hand smoke  Surgeon General's Warning:  Quitting smoking now greatly reduces serious risk to your health.     Obesity, smoking, and sedentary lifestyle greatly increases your risk for illness    A healthy diet, regular physical exercise & weight monitoring are important for maintaining a healthy lifestyle    You may be retaining fluid if you have a history of heart failure or if you experience any of the following symptoms:  Weight gain of 3 pounds or more overnight or 5 pounds in a week, increased swelling in our hands or feet or shortness of breath while lying flat in bed. Please call your doctor as soon as you notice any of these symptoms; do not wait until your next office visit. Recognize signs and symptoms of STROKE:    F-face looks uneven    A-arms unable to move or move unevenly    S-speech slurred or non-existent    T-time-call 911 as soon as signs and symptoms begin-DO NOT go       Back to bed or wait to see if you get better-TIME IS BRAIN. Warning Signs of HEART ATTACK     Call 911 if you have these symptoms:   Chest discomfort. Most heart attacks involve discomfort in the center of the chest that lasts more than a few minutes, or that goes away and comes back. It can feel like uncomfortable pressure, squeezing, fullness, or pain.  Discomfort in other areas of the upper body. Symptoms can include pain or discomfort in one or both arms, the back, neck, jaw, or stomach.  Shortness of breath with or without chest discomfort.  Other signs may include breaking out in a cold sweat, nausea, or lightheadedness. Don't wait more than five minutes to call 911 - MINUTES MATTER! Fast action can save your life. Calling 911 is almost always the fastest way to get lifesaving treatment. Emergency Medical Services staff can begin treatment when they arrive -- up to an hour sooner than if someone gets to the hospital by car. The discharge information has been reviewed with the patient. The patient verbalized understanding. Discharge medications reviewed with the patient and appropriate educational materials and side effects teaching were provided.   ___________________________________________________________________________________________________________________________________ Laparoscopic Hysterectomy: What to Expect at [facility]    A hysterectomy is surgery to take out the uterus. In some cases, the ovaries and fallopian tubes also are taken out at the same time. You can expect to feel better and stronger each day, but you may need pain medicine for a week or two. You may get tired easily or have less energy than usual. The tiredness may last for several weeks after surgery. You will probably notice that your belly is swollen and puffy. This is common. The swelling will take several weeks to go down. You may take about 4 to 6 weeks to fully recover. It is important to avoid lifting while you are recovering so that you can heal.  This care sheet gives you a general idea about how long it will take for you to recover. But each person recovers at a different pace. Follow the steps below to get better as quickly as possible. How can you care for yourself at home? Activity    · Rest when you feel tired.     · Be active. Walking is a good choice.     · Allow the area to heal. Don't move quickly or lift anything heavy until you are feeling better.     · You may shower 24 to 48 hours after surgery, if your doctor okays it. Pat the incision dry. Do not take a bath for the first 2 weeks, or until your doctor tells you it is okay.     · Ask your doctor when it is okay for you to have sex. Diet    · You can eat your normal diet. If your stomach is upset, try bland, low-fat foods like plain rice, broiled chicken, toast, and yogurt.     · If your bowel movements are not regular right after surgery, try to avoid constipation and straining. Drink plenty of water. Your doctor may suggest fiber, a stool softener, or a mild laxative. Medicines    · Your doctor will tell you if and when you can restart your medicines.  He or she will also give you instructions about taking any new medicines.     · If you take blood thinners, such as warfarin (Coumadin), clopidogrel (Plavix), or aspirin, be sure to talk to your doctor. He or she will tell you if and when to start taking those medicines again. Make sure that you understand exactly what your doctor wants you to do.     · Be safe with medicines. Read and follow all instructions on the label. ? If the doctor gave you a prescription medicine for pain, take it as prescribed. ? If you are not taking a prescription pain medicine, ask your doctor if you can take an over-the-counter medicine.     · If your doctor prescribed antibiotics, take them as directed. Do not stop taking them just because you feel better. You need to take the full course of antibiotics. Incision care    · You may have stitches over the cuts (incisions) the doctor made in your belly.     · If you have strips of tape on the cut (incision) the doctor made, leave the tape on for a week or until it falls off.     · Wash the area daily with warm, soapy water, and pat it dry. Don't use hydrogen peroxide or alcohol. They can slow healing.     · You may cover the area with a gauze bandage if it oozes fluid or rubs against clothing.     · Change the bandage every day. Other instructions    · You may have some light vaginal bleeding. Wear sanitary pads if needed. Do not douche or use tampons.     · Don't have sex until the doctor says it is okay. Follow-up care is a key part of your treatment and safety. Be sure to make and go to all appointments, and call your doctor if you are having problems. It's also a good idea to know your test results and keep a list of the medicines you take. When should you call for help? Call 911 anytime you think you may need emergency care.  For example, call if:    · You passed out (lost consciousness).     · You have chest pain, are short of breath, or cough up blood.    Call your doctor now or seek immediate medical care if:    · You have pain that does not get better after you take pain medicine.     · You cannot pass stoolsl or gas.     · You have vaginal discharge that has increased in amount or smells bad.     · You are sick to your stomach or cannot drink fluids.     · You have loose stitches, or your incision comes open.     · Bright red blood has soaked through the bandage over your incision.     · You have signs of infection, such as:  ? Increased pain, swelling, warmth, or redness. ? Red streaks leading from the incision. ? Pus draining from the incision. ? A fever.     · You have bright red vaginal bleeding that soaks one or more pads in an hour, or you have large clots.     · You have signs of a blood clot in your leg (called a deep vein thrombosis), such as:  ? Pain in your calf, back of the knee, thigh, or groin. ? Redness and swelling in your leg or groin.    Watch closely for changes in your health, and be sure to contact your doctor if you have any problems. Where can you learn more? Go to http://brandon-audrey.info/. Enter Q131 in the search box to learn more about \"Laparoscopic Hysterectomy: What to Expect at Home. \"  Current as of: May 15, 2018  Content Version: 11.8  © 9151-9540 [employer]. Care instructions adapted under license by [employer] (which disclaims liability or warranty for this information). If you have questions about a medical condition or this instruction, always ask your healthcare professional. [address] any warranty or liability for your use of this information.

## 2018-11-28 NOTE — PROGRESS NOTES
Pt c/o 3/10 pain and requests something before she leaves today, administered percocet 5 mg PO per MD order, will continue to monitor.

## 2018-11-28 NOTE — PROGRESS NOTES
Pt assessment completed. Alert and oriented. Lungs CTA even and unlabored. Heart sounds regular. Abdomen soft and tender with active bowel sounds. IV present with no s/sx of complications at this time. Pt c/o 5/10 pain but states she got her pain medication not too long ago. All needs met, will continue to monitor.

## 2018-11-28 NOTE — PROGRESS NOTES
Spiritual Care initial visit made by Smule. Support given. Card left. JUNIOR Humphries Div / Bereavement Coordinator

## 2018-11-28 NOTE — PROGRESS NOTES
Discharge instructions were reviewed with the patient and family. Opportunity for questions given. Patient verbalized understanding of discharge and follow up instructions, as well as S/S to report to MD or return to ER for. PIV was removed. Prescriptions provided.  Patient will D/C to home after she eats breakfast.

## 2019-04-26 ENCOUNTER — HOSPITAL ENCOUNTER (OUTPATIENT)
Dept: PHYSICAL THERAPY | Age: 49
Discharge: HOME | End: 2019-04-26
Attending: OBSTETRICS & GYNECOLOGY
Payer: COMMERCIAL

## 2019-04-26 DIAGNOSIS — N39.46 MIXED STRESS AND URGE URINARY INCONTINENCE: ICD-10-CM

## 2019-04-26 PROCEDURE — 97110 THERAPEUTIC EXERCISES: CPT

## 2019-04-26 PROCEDURE — 97161 PT EVAL LOW COMPLEX 20 MIN: CPT

## 2019-04-26 PROCEDURE — 97530 THERAPEUTIC ACTIVITIES: CPT

## 2019-04-26 NOTE — PROGRESS NOTES
Mariela Shelton  : 1970  Primary: Eliezer Hadley  Secondary:  [address] at [place] [address] [facility], [address] [place] [address],[address], [address] [facility]  [phone]   [phone]        OUTPATIENT PHYSICAL THERAPY: Daily Treatment Note 2019  MEDICAL/REFERRING DIAGNOSIS:  Mixed stress and urge urinary incontinence [N39.46]   REFERRING PHYSICIAN: Precious Mora MD  Pre-treatment Symptoms/Complaints:  See evaluation  Pain: Initial: Pain Intensity 1: 0  Post Session:  0/10   Medications Last Reviewed:  19   Objective Findings:  Mixed incontinence; urgency 1x/day. Leaking 1-2x/week  Frequency Q1-1.5hrs, 1x/night;  Drinks irritants, 36oz water   TREATMENT:   THERAPEUTIC EXERCISE: (15  minutes):  Exercises per grid below to improve strength and coordination. Required minimal verbal and tactile cues to promote proper body mechanics and promote proper body breathing techniques. Progressed resistance and repetitions as indicated. Date:  19      Activity/Exercise Parameters   Diaphragmatic breathing With tactile feedback   knack With cough/sneeze   kegel  w focus on relaxing               Pt ed       (Used abbreviations: MET - muscle energy technique; SCS- Strain counter strain; CTM- Connective tissue mobilizations; CR- Contract/relax; SP- Sustained pressure, TrP- Trigger point release, IASTM- Instrument assisted soft tissue mobilizations, TDN- Trigger point dry needling, DB - diaphragmatic breathing, PFM - pelvic floor muscles, OI - obturator internus, IC - iliococcygeus, PC - pubococcygeus, DTP - deep transverse perineum,      THERAPEUTIC ACTIVITIES (  25 minutes):   The following education topics were discussed with pt:  Pelvic anatomy, bladder health, bladder irritants, urge suppression techniques, diaphragmatic breathing, PFM drops, supplements for decreased bladder irritation: prelief, baking soda, increasing water intake  Treatment/Session Summary:    · Response to Treatment:  good ability to DB after treatment;  continue to be slow to relax after contraction. · Communication/Consultation:  Evaluation faxed to referring MD  · Equipment provided today:  None today  · Recommendations/Intent for next treatment session: Next visit will focus on biofeedback; Also discuss squatty potty, DB while on toilet; begin strengthening at home as long as cleared by biofeedback.   Treatment Plan of Care Effective Dates:  4/26/19 to 07/25/19   Total Treatment Billable Duration:  40 minutes  PT Patient Time In/Time Out  Time In: 1400  Time Out: [address], PT    Future Appointments   Date Time Provider Gricelda Parks   5/3/2019  2:00 PM Sandra Multani PT [facility]   5/31/2019  2:00 PM Sandra Multani PT [facility]   6/3/2019 10:00 AM Sandra Multani PT [facility]   6/10/2019 10:00 AM Sandra Multani PT [facility]   6/17/2019  1:00 PM Sandra Multani PT [facility]   6/24/2019 10:00 AM Sandra Multani PT [facility]

## 2019-04-26 NOTE — THERAPY EVALUATION
Jesus Forman  : 1970  Primary: [employer]  Secondary:  [address] at [facility]  [facility], [address] [place] [address],[address], [address] [facility]  [phone]   [phone]          OUTPATIENT PHYSICAL THERAPY:Initial Assessment 2019   ICD-10: Treatment Diagnosis: Mixed incontinence (N39.46), Muscle weakness (generalized) (M62.81), Other lack of coordination (R27.8)  Precautions/Allergies:   Latex; Bactrim [sulfamethoprim ds]; and Sulfa (sulfonamide antibiotics)   MD Orders: Evaluate and treat MEDICAL/REFERRING DIAGNOSIS:  Mixed stress and urge urinary incontinence [N39.46]   DATE OF ONSET: 2018  REFERRING PHYSICIAN: Herrera Plasencia MD  RETURN PHYSICIAN APPOINTMENT: TBD     INITIAL ASSESSMENT:  Ms. Lakshmi Wesley presents with decreased strength and coordination of PFM, as well as poor bladder habits, likely contributing to her mixed incontinence. Pt also demonstrates impaired breathing mechanics which may also place increased pressure on PFM. Pt will benefit from physical therapy to address stated problems. Plan of care was discussed and agreed upon with patient and HEP was initiated. Thank you for the opportunity to work with this patient. PROBLEM LIST (Impacting functional limitations):  1. Decreased [place] with Home Exercise Program  2. Decreased PFM coordination, poor bladder habits  3. Decreased strength of pelvic floor which limits bladder contro INTERVENTIONS PLANNED: (Treatment may consist of any combination of the following)  1. Neuromuscular re-education  2. Biofeedback as needed  3. HEP  4. Bladder retraining  5. Bladder education  6. Manual Therapy  7. Therapeutic Activites  8. Therapeutic Exercise/Strengthening   TREATMENT PLAN:  Effective Dates: 2019 TO 2019 (90 days). Frequency/Duration: 1 time a week for 90 Day(s)  GOALS: (Goals have been discussed and agreed upon with patient.)  Short-Term Functional Goals: Time Frame: 3 weeks  1.  Pt will be independent with HEP to assist in achieving below goals. 2. Pt will demonstrate improved bladder habits with 75% fewer irritants for decreased incidence of urgency by 50%  Discharge Goals: Time Frame: 7/25/2019   1. Pt will demonstrate improvement in PFM strength to 4/5 with good coordination to allow pt to cough without incident of REINA  3. Pt will demonstrate proper diaphragmatic breathing mechanics for decreased stress of PFM    MEDICAL NECESSITY:   · Patient demonstrates good rehab potential due to higher previous functional level. REASON FOR SERVICES/OTHER COMMENTS:  · Patient continues to require skilled intervention due to ongoing goals noted above. Total Duration:  PT Patient Time In/Time Out  Time In: 1400  Time Out: 1510    Rehabilitation Potential For Stated Goals: Good  Regarding Teri Merary Stephens's therapy, I certify that the treatment plan above will be carried out by a therapist or under their direction. Thank you for this referral,  Homar Bernard, PT     Referring Physician Signature: Maxime Murillo MD _______________________________ Date _____________     PAIN/SUBJECTIVE:   Initial: Pain Intensity 1: 0  Post Session:  0/10   HISTORY:   History of Injury/Illness (Reason for Referral):  Pt had fibroids pushing on bladder - reason for the hysterectomy 11/2018. Feels urgency every day. Usually after drinking coffee or tea. Leaking 1-2x/week. Nothing she feels she needs pad for now but doesn't want to have to use one in the future. REINA occurs with coughing and sneezing. Past Medical History/Comorbidities:   Ms. Inna Herrera  has a past medical history of Adverse effect of anesthesia, Arthritis, Endocrine disease, Fibroid, Gastroesophageal reflux disease, Nausea & vomiting, Papillary carcinoma of thyroid ([address] (5/14/2013), PVC (premature ventricular contraction), and Yeast vaginitis (7/26/2018).  She also has no past medical history of Aneurysm ([address] Asthma, Autoimmune disease ([address] CAD (coronary artery disease), Chronic kidney disease, Chronic obstructive pulmonary disease ([facility] Coagulation disorder ([facility] COPD, Dementia, Diabetes ([address] Endocarditis, Heart failure ([facility] Hypertension, Liver disease, Neurological disorder, Other ill-defined conditions(799.89), Psychiatric disorder, PUD (peptic ulcer disease), Rheumatic fever, Seizures ([facility] Sleep apnea, Sleep disorder, Stroke ([address] or Thromboembolus ([facility] Ms. Sujit Sun  has a past surgical history that includes hx heent; hx wisdom teeth extraction; and hx shahida and bso (12/2018). Social History/Living Environment:     NT  Prior Level of Function/Work/Activity:  Works desk job as a cancer researcher  Previous Treatment Approaches:          hysterectomy  Personal Factors:          Sex:  female        Age:  46 y.o. Ambulatory/Rehab Services H2 Model Falls Risk Assessment   Risk Factors:       No Risk Factors Identified Ability to Rise from Chair:       (0)  Ability to rise in a single movement   Falls Prevention Plan:       No modifications necessary   Total: (5 or greater = High Risk): 0   ©2010 [facility]. All Rights Reserved. [facility] Patent #[id number]. Federal Law prohibits the replication, distribution or use without written permission from [facility]   Current Medications:       Current Outpatient Medications:     OTHER, Progesterone/Testosterone cream daily, Disp: , Rfl:     estradiol (MINIVELLE) 0.05 mg/24 hr, 1 Patch by TransDERmal route two (2) days a week., Disp: 8 Patch, Rfl: 11    omeprazole/sodium bicarbonate (ZEGERID OTC PO), Take 1 Tab by mouth daily as needed. , Disp: , Rfl:     WP THYROID 48.75 mg tab, Take 1 Tab by mouth two (2) times a day., Disp: , Rfl: 0    traMADol (ULTRAM) 50 mg tablet, Take 1 Tab by mouth two (2) times daily as needed for Pain for up to 10 days. Max Daily Amount: 100 mg.  Indications: PAIN (Patient not taking: Reported on 11/15/2018), Disp: 40 Tab, Rfl: 0    vitamin E (AQUA GEMS) 400 unit capsule, Take 400 Units by mouth daily. , Disp: , Rfl:     multivitamin (ONE A DAY) tablet, Take 1 Tab by mouth daily. , Disp: , Rfl:     omega-3 fatty acids-vitamin e (FISH OIL) 1,000 mg Cap, Take 1 Cap by mouth three (3) times daily. , Disp: , Rfl:     ascorbic acid (VITAMIN C) 500 mg tablet, Take 1,000 mg by mouth daily. , Disp: , Rfl:    Date Last Reviewed:  04/26/19   Voiding Patterns:  Patient voids every 1-1.5 hours during the day and 1 times during the night. Patient reports that she empties bladder fully but stream is slower since surgery in November. Patient uses no pads for bladder protection. Fluid Intake:  Patient drinks 36 oz of water per day. She consumes coffee in the am and tea in the pm  Bowel Habits:  Patient demonstrates normal bowel habits. Daily  Personal / Social History:  · Sexually active? YES: denies dyspareunia      Number of Personal Factors/Comorbidities that affect the Plan of Care: 1-2: MODERATE COMPLEXITY   EXAMINATION:   External Observation:   · Voluntary Contraction: present  · Voluntary Relaxation: delayed  · Perineal Body Assessment: normal  · Skin Integrity: normal  · Q-tip Test: no tenderness  · Vaginal Vault Size: within normal limits    [place] PERFECT:  Via: vaginal canal        Strength: P: Power, E: Endurance, R: Repetitions, QF: Quick Flicks, TrA: Transverse Abdominus, T: Timing, DB: Diaphragmatic Breathing  P  3. Slow to release   E 8   R    QF 3   TrA    T    DB Poor; Chest breather                 Palpation:   Right Left   Bulbocavernosus     Ischocavernosus     Superficial Transverse Perineal     Sphincter Urethrae     Compressor Urethra      Urethra-vaginalis     Deep Transverse Perineium     Obturator Internus     Iliococcygeus     Coccygeus     Pubococcygeus     Levator Ani     Adductor  Mild tenderness   Psoas Mild tenderness Mild tenderness   Ant.  Abdominal wall             Prolapse:  · Tissue support test with bearing down (Grade 1: not visible at introitus; Grade 2: visible at introitus; Grade 3: tissue outside introitus):  · Anterior Wall = nt   · Posterior Wall = nt       Body Structures Involved:  1. Nerves  2. Muscles  3. Ligaments Body Functions Affected:  1. Mental  2. Sensory/Pain  3. Genitourinary  4. Neuromusculoskeletal  5. Movement Related Activities and Participation Affected:  1. Mobility  2. Self Care  3.  Interpersonal Interactions and Relationships   Number of elements (examined above) that affect the Plan of Care: 3: MODERATE COMPLEXITY   CLINICAL PRESENTATION:   Presentation: Stable and uncomplicated: LOW COMPLEXITY   CLINICAL DECISION MAKING:   Use of outcome tool(s) and clinical judgement create a POC that gives a: Clear prediction of patient's progress: LOW COMPLEXITY

## 2019-05-03 ENCOUNTER — HOSPITAL ENCOUNTER (OUTPATIENT)
Dept: PHYSICAL THERAPY | Age: 49
Discharge: HOME | End: 2019-05-03
Attending: OBSTETRICS & GYNECOLOGY
Payer: COMMERCIAL

## 2019-05-03 DIAGNOSIS — N39.46 MIXED STRESS AND URGE URINARY INCONTINENCE: ICD-10-CM

## 2019-05-03 PROCEDURE — 97110 THERAPEUTIC EXERCISES: CPT

## 2019-05-03 NOTE — PROGRESS NOTES
Andriy Heading  : 1970  Primary: [facility]  Secondary:  [address]  at [place] [id number] [facility], [address] [place] [address],[address], 7[address] [facility]  [phone]   [phone]        OUTPATIENT PHYSICAL THERAPY: Daily Treatment Note 5/3/2019  MEDICAL/REFERRING DIAGNOSIS:  Mixed stress and urge urinary incontinence [N39.46]   REFERRING PHYSICIAN: Sabas Cano MD  Pre-treatment Symptoms/Complaints:  Has been working on breathing and suppression techniques. Feels like urgency has been about the same this week. Pain: Initial: Pain Intensity 1: 0  Post Session:  0/10   Medications Last Reviewed:  19   Objective Findings:  Mixed incontinence; urgency 1x/day. Leaking 1-2x/week  Frequency Q1-1.5hrs, 1x/night;  Drinks irritants, 36oz water   TREATMENT:   THERAPEUTIC EXERCISE: (58  minutes):  Exercises per grid below to improve strength and coordination. Required minimal verbal and tactile cues to promote proper body mechanics and promote proper body breathing techniques. Progressed resistance and repetitions as indicated. Date:  19   Activity/Exercise Parameters   Diaphragmatic breathing With tactile feedback   knack Reviewed before cough/sneeze   kegel  With biofeedback 5/10, 2/4               Pt ed Urge suppression techniques, bladder irritants, drinking water before irritant, Prelief or 1 tsp baking soda w water before irritant      (Used abbreviations: MET - muscle energy technique; SCS- Strain counter strain; CTM- Connective tissue mobilizations; CR- Contract/relax; SP- Sustained pressure, TrP- Trigger point release, IASTM- Instrument assisted soft tissue mobilizations, TDN- Trigger point dry needling, DB - diaphragmatic breathing, PFM - pelvic floor muscles, OI - obturator internus, IC - iliococcygeus, PC - pubococcygeus, DTP - deep transverse perineum,      Treatment/Session Summary:    · Response to Treatment:  much improved release after contraction today. started strengthening and educated for home. pt states urgency happens always 1 hr after drinking her coffee.  won't stop completely d/t caffeine headache. discussed alternatives above. next appt not for 3 weeks. emphasized importance of suppression techniques as well as holding from strengthening if she feels it difficult to relax with ex's. .  · Communication/Consultation:  None today  · Equipment provided today:  None today  · Recommendations/Intent for next treatment session: Next visit will focus on biofeedback;   Also discuss ruy denton DB while on toilet; progress strengthening  Treatment Plan of Care Effective Dates:  4/26/19 to 7/25/19  Total Treatment Billable Duration:  58 minutes  PT Patient Time In/Time Out  Time In: 1400  Time Out: [address], PT    Future Appointments   Date Time Provider Gricelda Parks   5/31/2019  2:00 PM Dion Floyd, PT [facility] SFVIPIN   6/3/2019 10:00 AM Dion Floyd, PT CELESTEEJOSE F SFE   6/10/2019 10:00 AM Dion Floyd, PT CELESTEEORPADALGISA SFE   6/17/2019  1:00 PM Dion Floyd, PT SFEORPT SFE   6/24/2019 10:00 AM Dion Floyd, PT SFEORPT SFE

## 2019-05-14 ENCOUNTER — HOSPITAL ENCOUNTER (OUTPATIENT)
Dept: PHYSICAL THERAPY | Age: 49
Discharge: HOME | End: 2019-05-14
Attending: OBSTETRICS & GYNECOLOGY
Payer: COMMERCIAL

## 2019-05-14 PROCEDURE — 97110 THERAPEUTIC EXERCISES: CPT

## 2019-05-14 NOTE — PROGRESS NOTES
[facility]  : 1970  Primary: [facility]  Secondary:  [address] at [facility]  [address] [facility], [address] [place] [address],[address], [address].  [phone]   [phone]        OUTPATIENT PHYSICAL THERAPY: Daily Treatment Note 2019  MEDICAL/REFERRING DIAGNOSIS:  Mixed incontinence [N39.46]   REFERRING PHYSICIAN: Claudia Valverde MD  Pre-treatment Symptoms/Complaints:  Has been working on continuing to wean off coffee. Down to 4oz/morning. Has urge to urinate consistently 15-20' after she drinks her coffee and about every 15' for the next hour. Urgency improved but frequency has increased. Feels slow stream ever since her hysterectomy and especially notices this when she wakes in the am.    Pain: Initial: Pain Intensity 1: 0  Post Session:  0/10   Medications Last Reviewed:  19   Objective Findings:  Mixed incontinence; urgency 1x/day. Leaking 1-2x/week  Frequency Q1-1.5hrs, 1x/night;  Drinks irritants, 36oz water   TREATMENT:   THERAPEUTIC EXERCISE: (55  minutes):  Exercises per grid below to improve strength and coordination. Required minimal verbal and tactile cues to promote proper body mechanics and promote proper body breathing techniques. Progressed resistance and repetitions as indicated.     Date:  19   Activity/Exercise Parameters   Diaphragmatic breathing w stretch   knack    kegel     Perineum stretch 2x1' with DB   Bridge with ball squeeze 10x       Pt ed Increasing water intake, continuing to wean off coffee, knack, effect of fibroids on bladder function and function of detrusor muscle, retraining of bladder's ability to expand      (Used abbreviations: MET - muscle energy technique; SCS- Strain counter strain; CTM- Connective tissue mobilizations; CR- Contract/relax; SP- Sustained pressure, TrP- Trigger point release, IASTM- Instrument assisted soft tissue mobilizations, TDN- Trigger point dry needling, DB - diaphragmatic breathing, PFM - pelvic floor muscles, OI - obturator internus, IC - iliococcygeus, PC - pubococcygeus, DTP - deep transverse perineum,      Treatment/Session Summary:    · Response to Treatment:  voiced understanding on ed about bladder retraining to improve expansion. assess prolonged response to strengthening w biofeedback next visit. · Communication/Consultation:  None today  · Equipment provided today:  None today  · Recommendations/Intent for next treatment session: Next visit will focus on biofeedback;   Also discuss ruy denton DB while on toilet; progress strengthening  Treatment Plan of Care Effective Dates:  4/26/19 to 7/25/19  Total Treatment Billable Duration:  55 minutes  PT Patient Time In/Time Out  Time In: 1100  Time Out: 1200  Lissa Hill PT    Future Appointments   Date Time Provider Gricelda Parks   5/28/2019  5:00 PM Torres Davila PT [facility] DARBY   5/31/2019  2:00 PM Torres Davila, PT ALONSO PASTOR   6/3/2019 10:00 AM Torres Davila, PT DARBYORPADALGISA PASTOR   6/10/2019 10:00 AM Torres Davila, PT CELESTEEORPADALGISA PASTOR   6/17/2019  1:00 PM Torres Davila, PT DARBYORPADALGISA PASTOR   6/24/2019 10:00 AM Torres Davila, PT CELESTEEORPADALGISA PASTOR

## 2019-05-28 ENCOUNTER — HOSPITAL ENCOUNTER (OUTPATIENT)
Dept: PHYSICAL THERAPY | Age: 49
Discharge: HOME | End: 2019-05-28
Attending: OBSTETRICS & GYNECOLOGY
Payer: COMMERCIAL

## 2019-05-28 PROCEDURE — 97110 THERAPEUTIC EXERCISES: CPT

## 2019-05-28 NOTE — PROGRESS NOTES
David Alcazar  : 1970  Primary: [facility]  Secondary:  [address] at [address] [place] [address] [facility], [address] [place] [address],[address], [facility] [address].  [phone]   [phone]        OUTPATIENT PHYSICAL THERAPY: Daily Treatment Note 2019  MEDICAL/REFERRING DIAGNOSIS:  Mixed incontinence [N39.46]   REFERRING PHYSICIAN: Adair Nina MD  Pre-treatment Symptoms/Complaints:     States she has been doing kegels and bridges at home. She did have one urgency accident (2Tbs leakage). Increased water intake from 36 to 48 oz/day and is urinating Q45' during the day. Feels her slow stream was 35% of normal right after hysterectomy and has improved to 50% now. Slow stream worst first thing in the am. States she started noticing kegels slow to release at end of her session (around #8)  Pain: Initial: Pain Intensity 1: 0  Post Session:  0/10   Medications Last Reviewed:  19   Objective Findings:  Mixed incontinence; urgency 1x/day. Leaking 1-2x/week  Frequency Q1-1.5hrs, 1x/night;  Drinks irritants, 36oz water   TREATMENT:   THERAPEUTIC EXERCISE: (60  minutes):  Exercises per grid below to improve strength and coordination. Required minimal verbal and tactile cues to promote proper body mechanics and promote proper body breathing techniques. Progressed resistance and repetitions as indicated. Date:  19   Activity/Exercise Parameters   Diaphragmatic breathing w drops   knack Discussed with cough/sneeze   kegel  With biofeedback 5/10 paired with adductor squeeze, 2/4   Perineum stretch With drops   Bridge with ball squeeze        Pt ed Techniques to help urine stream - pressure w fist, double void, use of stool and DB. Stop kegels when notice slow to release.        (Used abbreviations: MET - muscle energy technique; SCS- Strain counter strain; CTM- Connective tissue mobilizations; CR- Contract/relax; SP- Sustained pressure, TrP- Trigger point release, IASTM- Instrument assisted soft tissue mobilizations, TDN- Trigger point dry needling, DB - diaphragmatic breathing, PFM - pelvic floor muscles, OI - obturator internus, IC - iliococcygeus, PC - pubococcygeus, DTP - deep transverse perineum,      Treatment/Session Summary:    · Response to Treatment:  low resting tone and able to return to resting level quickly after contraction but strength of contraction worse today compared to last visit. stability of contraction much improved with use of adductor squeeze and was able to hold close to 5 sec on biofeedback but with less strength. · Communication/Consultation:  None today  · Equipment provided today:  None today  Recommendations/Intent for next treatment session: Next visit discusses progress with slow am stream, ability to release w kegels, urinary frequency. consider timed voiding.   Treatment Plan of Care Effective Dates:  4/26/19 to 7/25/19  Total Treatment Billable Duration:  60 minutes  PT Patient Time In/Time Out  Time In: 1700  Time Out: 411 [facility], PT    Future Appointments   Date Time Provider Gricelda Parks   6/3/2019 10:00 AM Luigi Anderson PT [facility] DARBY   6/10/2019 10:00 AM Luigi Anderson PT ALONSO PASTOR   6/17/2019  1:00 PM Luigi Anderson PT ALONSO PASTOR   6/24/2019 10:00 AM Luigi Anderson PT ALONSO PASTOR

## 2019-05-31 ENCOUNTER — APPOINTMENT (OUTPATIENT)
Dept: PHYSICAL THERAPY | Age: 49
End: 2019-05-31
Attending: OBSTETRICS & GYNECOLOGY
Payer: COMMERCIAL

## 2019-06-03 ENCOUNTER — HOSPITAL ENCOUNTER (OUTPATIENT)
Dept: PHYSICAL THERAPY | Age: 49
Discharge: HOME | End: 2019-06-03
Attending: OBSTETRICS & GYNECOLOGY
Payer: COMMERCIAL

## 2019-06-03 PROCEDURE — 97110 THERAPEUTIC EXERCISES: CPT

## 2019-06-03 NOTE — PROGRESS NOTES
Ginette Fitzgerald  : 1970  Primary: [facility]  Secondary:  [address] at [facility]  [address] [facility] [address] [address], [address] [facility]  [phone]   [phone]        OUTPATIENT PHYSICAL THERAPY: Daily Treatment Note 6/3/2019  MEDICAL/REFERRING DIAGNOSIS:  Mixed incontinence [N39.46]   REFERRING PHYSICIAN: Grant Gould MD  Pre-treatment Symptoms/Complaints:     States she has been doing kegels and bridges at home. She did have one urgency accident (2Tbs leakage). Increased water intake from 36 to 48 oz/day and is urinating Q45' during the day. Feels her slow stream was 35% of normal right after hysterectomy and has improved to 50% now. Slow stream worst first thing in the am. States she started noticing kegels slow to release at end of her session (around #8)  States she has been working on breathing w ex's - not breath holding. Using Prelief. Tried some of the techniques to help   Pain: Initial: Pain Intensity 1: 0  Post Session:  0/10   Medications Last Reviewed:  19   Objective Findings:  Mixed incontinence; urgency 1x/day. Leaking 1-2x/week  Frequency Q1-1.5hrs, 1x/night;  Drinks irritants, 36oz water   TREATMENT:   THERAPEUTIC EXERCISE: (55  minutes):  Exercises per grid below to improve strength and coordination. Required minimal verbal and tactile cues to promote proper body mechanics and promote proper body breathing techniques. Progressed resistance and repetitions as indicated. Date:  19   Activity/Exercise Parameters   Diaphragmatic breathing    knack    kegel     Perineum stretch After strengthening ex's   Bridge with ball squeeze 15x   Bridge with isometric ER BTB 10x   Sidelying clam with TB    Isometric TA Sidelying 5/10       Pt ed Timed voiding, techniques to help with dribbling after urination, urge suppression techniques    Access Code: [facility]   URL: [email or web address]   Date: 2019   Prepared by: Suzie Perales   Exercises   Supine Bridge with Mini Swiss Ball Between Knees - 10 reps - 1x daily   Supine Bridge with Resistance Band - 15 reps   Sidelying Transversus Abdominis Bracing - 15 reps - 5 hold     (Used abbreviations: MET - muscle energy technique; SCS- Strain counter strain; CTM- Connective tissue mobilizations; CR- Contract/relax; SP- Sustained pressure, TrP- Trigger point release, IASTM- Instrument assisted soft tissue mobilizations, TDN- Trigger point dry needling, DB - diaphragmatic breathing, PFM - pelvic floor muscles, OI - obturator internus, IC - iliococcygeus, PC - pubococcygeus, DTP - deep transverse perineum,      Treatment/Session Summary:    · Response to Treatment:  significant amount of time educating on above topics. exercise focused on larger muscle groups to compliment PFM. some difficulty with TA - began in supine for better tactile feedback. · Communication/Consultation:  None today  · Equipment provided today:  BTB  Recommendations/Intent for next treatment session: Next visit progress larger muscle group exs.   Treatment Plan of Care Effective Dates:  4/26/19 to 7/25/19  Total Treatment Billable Duration:  60 minutes  PT Patient Time In/Time Out  Time In: 1000  Time Out: 1100  Ankit Cope PT    Future Appointments   Date Time Provider Gricelda Parks   6/3/2019  2:00 PM MD JESSE Ta CMW CMW   6/10/2019 10:00 AM CHETAN Ross   6/17/2019  1:00 PM CHETAN Ross   6/24/2019 10:00 AM CHETAN Ross

## 2019-06-10 ENCOUNTER — HOSPITAL ENCOUNTER (OUTPATIENT)
Dept: PHYSICAL THERAPY | Age: 49
Discharge: HOME | End: 2019-06-10
Attending: OBSTETRICS & GYNECOLOGY
Payer: COMMERCIAL

## 2019-06-10 PROCEDURE — 97110 THERAPEUTIC EXERCISES: CPT

## 2019-06-10 NOTE — PROGRESS NOTES
Jose Fry  : 1970  Primary: [facility]  Secondary:  [address] at [facility]  27[address] [facility], [address] [place] [address],[address], [address] [facility]  [phone]   [phone]        OUTPATIENT PHYSICAL THERAPY: Daily Treatment Note 6/10/2019  MEDICAL/REFERRING DIAGNOSIS:  Mixed incontinence [N39.46]   REFERRING PHYSICIAN: Alvarez Bhandari MD  Pre-treatment Symptoms/Complaints:     States she feels like she is going a little better with urgency and frequency. Stream a little better. Wearing panty liners. No leaks since before last visit. Pain: Initial: Pain Intensity 1: 0  Post Session:  0/10   Medications Last Reviewed:  06/10/19   Objective Findings:  Mixed incontinence; urgency 1x/day. Leaking 1-2x/week  Frequency Q1-1.5hrs, 1x/night;  Drinks irritants, 36oz water   TREATMENT:   THERAPEUTIC EXERCISE: (55  minutes):  Exercises per grid below to improve strength and coordination. Required minimal verbal and tactile cues to promote proper body mechanics and promote proper body breathing techniques. Progressed resistance and repetitions as indicated. Date:  06/10/19   Activity/Exercise Parameters   Diaphragmatic breathing    knack        Perineum stretch After strengthening ex's   Bridge with ball squeeze 12x   Bridge with isometric ER BTB 12x   Sidelying clam with TB BTB 10x   Isometric TA w clams   sidestepping 1 lap   Pt ed Timed voiding review,     Access Code: [facility]   URL: [email or web address]   Date: 2019   Prepared by: Gera Aragon   Exercises   Supine Bridge with Moody Soup Between Knees - 10 reps - 1x daily   Supine Bridge with Resistance Band - 15 reps   Sidelying Transversus Abdominis Bracing - 15 reps - 5 hold   Access Code: [email or web address]   URL: [email or web address]   Date: 06/10/2019   Prepared by: Gera Aragon   Program Notes   Try exercises 10-20 reps 3-4x/week   Exercises   Clamshell with Resistance   Sidestepping with Pelvic Floor Contraction     (Used abbreviations: MET - muscle energy technique; SCS- Strain counter strain; CTM- Connective tissue mobilizations; CR- Contract/relax; SP- Sustained pressure, TrP- Trigger point release, IASTM- Instrument assisted soft tissue mobilizations, TDN- Trigger point dry needling, DB - diaphragmatic breathing, PFM - pelvic floor muscles, OI - obturator internus, IC - iliococcygeus, PC - pubococcygeus, DTP - deep transverse perineum,      Treatment/Session Summary:    · Response to Treatment:  progressing slowly but steadily with improving frequency and urgency. continue with poor endurance. · Communication/Consultation:  None today  · Equipment provided today:  None today  Recommendations/Intent for next treatment session: Next visit progress larger muscle group exs.   Reassess biofeedback  Treatment Plan of Care Effective Dates:  4/26/19 to 7/25/19  Total Treatment Billable Duration:  55 minutes  PT Patient Time In/Time Out  Time In: 1000  Time Out: 1100  Doonvan Kim PT    Future Appointments   Date Time Provider Gricelda Parks   6/17/2019  1:00 PM Tara Simmons PT [facility] DARBY   6/24/2019 10:00 AM CHETAN Peters   7/15/2019  9:00 AM CHETAN Peters

## 2019-06-17 ENCOUNTER — HOSPITAL ENCOUNTER (OUTPATIENT)
Dept: MAMMOGRAPHY | Age: 49
Discharge: HOME | End: 2019-06-17
Attending: OBSTETRICS & GYNECOLOGY
Payer: COMMERCIAL

## 2019-06-17 ENCOUNTER — HOSPITAL ENCOUNTER (OUTPATIENT)
Dept: PHYSICAL THERAPY | Age: 49
Discharge: HOME | End: 2019-06-17
Attending: OBSTETRICS & GYNECOLOGY
Payer: COMMERCIAL

## 2019-06-17 DIAGNOSIS — N64.4 BREAST PAIN: ICD-10-CM

## 2019-06-17 PROCEDURE — 77066 DX MAMMO INCL CAD BI: CPT

## 2019-06-17 PROCEDURE — 76642 ULTRASOUND BREAST LIMITED: CPT

## 2019-06-17 PROCEDURE — 97110 THERAPEUTIC EXERCISES: CPT

## 2019-06-24 ENCOUNTER — APPOINTMENT (OUTPATIENT)
Dept: PHYSICAL THERAPY | Age: 49
End: 2019-06-24
Attending: OBSTETRICS & GYNECOLOGY
Payer: COMMERCIAL

## 2019-07-09 ENCOUNTER — HOSPITAL ENCOUNTER (OUTPATIENT)
Dept: PHYSICAL THERAPY | Age: 49
Discharge: HOME | End: 2019-07-09
Attending: OBSTETRICS & GYNECOLOGY
Payer: COMMERCIAL

## 2019-07-09 PROCEDURE — 97110 THERAPEUTIC EXERCISES: CPT

## 2019-07-09 NOTE — PROGRESS NOTES
Nadeen García  : 1970  Primary: Mariela [facility]  Secondary:  [address] at [facility]  [address] [facility], [address], [facility].  [phone]   [phone]        OUTPATIENT PHYSICAL THERAPY: Daily Treatment Note 2019  MEDICAL/REFERRING DIAGNOSIS:  Mixed incontinence [N39.46]   REFERRING PHYSICIAN: Wendy Dacosta MD  Pre-treatment Symptoms/Complaints:     States she is keeping up with ex's and seeing improvement. She feels she is able to hold better when trying to squeeze. Pain: Initial: Pain Intensity 1: 0  Post Session:  0/10   Medications Last Reviewed:  19   Objective Findings:  Mixed incontinence; urgency 1x/day. Leaking 1-2x/week  Frequency Q1-1.5hrs, 1x/night;  Drinks irritants, 36oz water   TREATMENT:   THERAPEUTIC EXERCISE: (58  minutes):  Exercises per grid below to improve strength and coordination. Required minimal verbal and tactile cues to promote proper body mechanics and promote proper body breathing techniques. Progressed resistance and repetitions as indicated. Date:  19   Activity/Exercise Parameters   Inversion table Next visit   knack    squat 10x. Next visit with band around knees   Single leg crunch Next visit   Bridge with ball squeeze 12x   Bridge with isometric ER BTB 12x   Sidelying clam with TB BTB 10x   Isometric TA w clams, marches x10, heel slides x10, BKFO x10   sidestepping 2 lap   Pt ed Squats and bridge with TB for home    Access Code: [facility]   URL: [email or web address]   Date: 2019   Prepared by: Marita Beatty   Exercises   Supine Bridge with Moody Soup Between Knees - 10 reps - 1x daily   Supine Bridge with Resistance Band - 15 reps   Sidelying Transversus Abdominis Bracing - 15 reps - 5 hold   Access Code: [email or web address]   URL: [email or web address]   Date: 06/10/2019   Prepared by: Marita Beatty   Program Notes   Try exercises 10-20 reps 3-4x/week   Exercises Clamshell with Resistance   Sidestepping with Pelvic Floor Contraction  Access Code: [email or web address]   URL: [email or web address]   Date: 07/09/2019   Prepared by: Brittany Mazariegos   Exercises   Supine Bridge with Resistance Band   Squat     (Used abbreviations: MET - muscle energy technique; SCS- Strain counter strain; CTM- Connective tissue mobilizations; CR- Contract/relax; SP- Sustained pressure, TrP- Trigger point release, IASTM- Instrument assisted soft tissue mobilizations, TDN- Trigger point dry needling, DB - diaphragmatic breathing, PFM - pelvic floor muscles, OI - obturator internus, IC - iliococcygeus, PC - pubococcygeus, DTP - deep transverse perineum,      Treatment/Session Summary:    · Response to Treatment:  progressing well with ex's. D/C next visit to [facility]. · Communication/Consultation:  None today  · Equipment provided today:  None today  Recommendations/Intent for next treatment session: Next visit review all ex's for discharge to [facility] next visit.     Treatment Plan of Care Effective Dates:  4/26/19 to 7/25/19  Total Treatment Billable Duration:  58 minutes  PT Patient Time In/Time Out  Time In: 1700  Time Out: 1800  Britton Neumann PT    Future Appointments   Date Time Provider Gricelda Parks   7/9/2019  5:00 PM Ana Farias [facility] DARBY   7/11/2019  2:45 PM Chan Bowens MD [facility]   7/15/2019  9:00 AM Ana Farias, PT CELESTEEJOSE F SFE

## 2019-07-15 ENCOUNTER — HOSPITAL ENCOUNTER (OUTPATIENT)
Dept: PHYSICAL THERAPY | Age: 49
Discharge: HOME | End: 2019-07-15
Attending: OBSTETRICS & GYNECOLOGY
Payer: COMMERCIAL

## 2019-07-15 PROCEDURE — 97110 THERAPEUTIC EXERCISES: CPT

## 2019-07-15 NOTE — PROGRESS NOTES
Leif Sutton  : 1970  Primary: L[facility]  Secondary:  [address] at [place] [address] [facility], [address] [place] [address],[address], [facility] [address].  [phone]   [phone]        OUTPATIENT PHYSICAL THERAPY: Daily Treatment Note 7/15/2019  MEDICAL/REFERRING DIAGNOSIS:  Mixed incontinence [N39.46]   REFERRING PHYSICIAN: Jacque Ryder MD  Pre-treatment Symptoms/Complaints:     States she is performing ex's as she can  Pain: Initial: Pain Intensity 1: 0  Post Session:  0/10   Medications Last Reviewed:  07/15/19   Objective Findings:  Mixed incontinence; urgency 1x/day. Leaking 1-2x/week  Frequency Q1-1.5hrs, 1x/night;  Drinks irritants, 36oz water   TREATMENT:   THERAPEUTIC EXERCISE: (58  minutes):  Exercises per grid below to improve strength and coordination. Required minimal verbal and tactile cues to promote proper body mechanics and promote proper body breathing techniques. Progressed resistance and repetitions as indicated. Date:  07/15/19   Activity/Exercise Parameters   Inversion table practiced   knack    squat 10x. BTB   Single leg crunch Fwd and obliques   Bridge with ball squeeze 12x   Bridge with isometric ER BTB 12x   Sidelying clam with TB BTB 10x   Isometric TA w clams, marches x10, heel slides x10, BKFO x10   sidestepping 2 lap   Pt ed Squats and bridge with TB for home    Access Code: [facility]   URL: [email or web address]   Date: 2019   Prepared by: Gosia Bailey   Exercises   Supine Bridge with Moody Soup Between Knees - 10 reps - 1x daily   Supine Bridge with Resistance Band - 15 reps   Sidelying Transversus Abdominis Bracing - 15 reps - 5 hold   Access Code: [email or web address]   URL: [email or web address]   Date: 06/10/2019   Prepared by: Gosia Bailey   Program Notes   Try exercises 10-20 reps 3-4x/week   Exercises   Clamshell with Resistance   Sidestepping with Pelvic Floor Contraction  Access Code: Matias Multani   URL: [email or web address]/   Date: 07/09/2019   Prepared by: Espinoza Lynch   Exercises   Supine Bridge with Resistance Band   Squat   Access Code: [email or web address]   URL: [email or web address]   Date: 07/15/2019   Prepared by: Espinoza Lynch   Exercises   Neutral Curl Up with Arms Across Chest   TA with Heel Slide     (Used abbreviations: MET - muscle energy technique; SCS- Strain counter strain; CTM- Connective tissue mobilizations; CR- Contract/relax; SP- Sustained pressure, TrP- Trigger point release, IASTM- Instrument assisted soft tissue mobilizations, TDN- Trigger point dry needling, DB - diaphragmatic breathing, PFM - pelvic floor muscles, OI - obturator internus, IC - iliococcygeus, PC - pubococcygeus, DTP - deep transverse perineum,      Treatment/Session Summary:    · Response to Treatment:  good understanding of HEP. D/C PT.  encouraged pt to call with any future questions or concerns.   · Communication/Consultation:  None today  · Equipment provided today:  None today  Treatment Plan of Care Effective Dates:  4/26/19 to 7/25/19  Total Treatment Billable Duration:  58 minutes  PT Patient Time In/Time Out  Time In: 0900  Time Out: 1000  Raciel Doss PT    Future Appointments   Date Time Provider Gricelda Parks   7/19/2019  2:15 PM Taye Alicea MD [facility]   8/1/2019  4:00 PM Taye Alicea MD [facility]

## 2019-07-15 NOTE — THERAPY EVALUATION
Isabel Lala  : 1970  Primary: Quang Hadley  Secondary:  [address] at [place] [id number] [facility], [address] [place] [address],[address], [facility] [address]  [phone]   [phone]          OUTPATIENT PHYSICAL THERAPY:Discharge Summary 7/15/2019   ICD-10: Treatment Diagnosis: Mixed incontinence (N39.46), Muscle weakness (generalized) (M62.81), Other lack of coordination (R27.8)  Precautions/Allergies:   Latex; Bactrim [sulfamethoprim ds]; and Sulfa (sulfonamide antibiotics)   MD Orders: Evaluate and treat MEDICAL/REFERRING DIAGNOSIS:  Mixed incontinence [N39.46]   DATE OF ONSET: 2018  REFERRING PHYSICIAN: Alberta Perea MD  RETURN PHYSICIAN APPOINTMENT: TBD   Discharge Assessment 7/15/19:   Pt has demonstrated excellent progress with PT since initiation on . She has improved with strength as well as coordination of PFM, improved her bladder habits and has gained awareness of substances that are irritating to her bladder. Pt is still drinking a small amount of coffee in the am and experiences mild frequency with this, but otherwise denies any of her initial c/o of urinary urgency, frequency, and incontinence. She sometimes wears pads just in case but reports no leaking in over a month. Pt ready for discharge to HEP. Encouraged her to call in the future with any additional questions or concerns. INITIAL ASSESSMENT:  Ms. Mariah To presents with decreased strength and coordination of PFM, as well as poor bladder habits, likely contributing to her mixed incontinence. Pt also demonstrates impaired breathing mechanics which may also place increased pressure on PFM. Pt will benefit from physical therapy to address stated problems. Plan of care was discussed and agreed upon with patient and HEP was initiated. Thank you for the opportunity to work with this patient. PROBLEM LIST (Impacting functional limitations):  1.  Decreased [place]be[place] with Home Exercise Program  2. Decreased PFM coordination, poor bladder habits  3. Decreased strength of pelvic floor which limits bladder contro INTERVENTIONS PLANNED: (Treatment may consist of any combination of the following)  1. Neuromuscular re-education  2. Biofeedback as needed  3. HEP  4. Bladder retraining  5. Bladder education  6. Manual Therapy  7. Therapeutic Activites  8. Therapeutic Exercise/Strengthening   TREATMENT PLAN:  Effective Dates: 4/26/2019 TO 7/25/2019 (90 days). Frequency/Duration: 1 time a week for 90 Day(s)  GOALS: (Goals have been discussed and agreed upon with patient.)  Short-Term Functional Goals: Time Frame: 3 weeks  1. Pt will be independent with HEP to assist in achieving below goals. GOAL MET  2. Pt will demonstrate improved bladder habits with 75% fewer irritants for decreased incidence of urgency by 50%. GOAL MET  Discharge Goals: Time Frame: 7/25/2019   1. Pt will demonstrate improvement in PFM strength to 4/5 with good coordination to allow pt to cough without incident of REINA. 3. Pt will demonstrate proper diaphragmatic breathing mechanics for decreased stress of PFM. GOAL MET    MEDICAL NECESSITY:   · Patient demonstrates good rehab potential due to higher previous functional level. REASON FOR SERVICES/OTHER COMMENTS:  · Patient continues to require skilled intervention due to ongoing goals noted above. Total Duration:  PT Patient Time In/Time Out  Time In: 0900  Time Out: 1000    Rehabilitation Potential For Stated Goals: Good  Regarding Mary Stephens's therapy, I certify that the treatment plan above will be carried out by a therapist or under their direction. Thank you for this referral,  Mary Sampson, PT     Referring Physician Signature: Linda Portillo MD No Signature is Required for this note.

## 2019-09-25 PROBLEM — M79.631 RIGHT FOREARM PAIN: Status: ACTIVE | Noted: 2018-12-12

## 2019-10-29 ENCOUNTER — HOSPITAL ENCOUNTER (OUTPATIENT)
Dept: LAB | Age: 49
Discharge: HOME | End: 2019-10-29

## 2019-10-29 PROCEDURE — 88305 TISSUE EXAM BY PATHOLOGIST: CPT

## 2019-10-29 PROCEDURE — 88312 SPECIAL STAINS GROUP 1: CPT

## 2020-03-09 ENCOUNTER — HOSPITAL ENCOUNTER (OUTPATIENT)
Dept: CT IMAGING | Age: 50
Discharge: HOME | End: 2020-03-09
Attending: NURSE PRACTITIONER
Payer: COMMERCIAL

## 2020-03-09 VITALS — WEIGHT: 160 LBS | BODY MASS INDEX: 26.66 KG/M2 | HEIGHT: 65 IN

## 2020-03-09 DIAGNOSIS — R31.0 GROSS HEMATURIA: ICD-10-CM

## 2020-03-09 PROCEDURE — 74178 CT ABD&PLV WO CNTR FLWD CNTR: CPT

## 2020-03-09 PROCEDURE — 74011636320 HC RX REV CODE- 636/320: Performed by: NURSE PRACTITIONER

## 2020-03-09 PROCEDURE — 74011000258 HC RX REV CODE- 258: Performed by: NURSE PRACTITIONER

## 2020-03-09 RX ORDER — SODIUM CHLORIDE 0.9 % (FLUSH) 0.9 %
10 SYRINGE (ML) INJECTION
Status: COMPLETED | OUTPATIENT
Start: 2020-03-09 | End: 2020-03-09

## 2020-03-09 RX ADMIN — Medication 10 ML: at 11:54

## 2020-03-09 RX ADMIN — SODIUM CHLORIDE 100 ML: 900 INJECTION, SOLUTION INTRAVENOUS at 11:54

## 2020-03-09 RX ADMIN — IOPAMIDOL 100 ML: 755 INJECTION, SOLUTION INTRAVENOUS at 11:54

## 2020-03-09 NOTE — PROGRESS NOTES
CT without etiology for hematuria. Keep follow for cystoscopy. There was a possible polyp on the gallbladder. You need to follow up with primary care physician for this. Please contact the office with any further questions or concerns.

## 2020-10-01 ENCOUNTER — TRANSCRIBE ORDER (OUTPATIENT)
Dept: SCHEDULING | Age: 50
End: 2020-10-01

## 2020-10-01 DIAGNOSIS — R79.89 ELEVATED LFTS: ICD-10-CM

## 2020-10-01 DIAGNOSIS — R93.2 ABNORMAL CT SCAN, LIVER: Primary | ICD-10-CM

## 2020-10-07 ENCOUNTER — HOSPITAL ENCOUNTER (OUTPATIENT)
Dept: ULTRASOUND IMAGING | Age: 50
Discharge: HOME | End: 2020-10-07
Attending: FAMILY MEDICINE
Payer: COMMERCIAL

## 2020-10-07 DIAGNOSIS — R93.2 ABNORMAL CT SCAN, LIVER: ICD-10-CM

## 2020-10-07 DIAGNOSIS — R79.89 ELEVATED LFTS: ICD-10-CM

## 2020-10-07 PROCEDURE — 76700 US EXAM ABDOM COMPLETE: CPT

## 2020-10-20 PROBLEM — K82.4 POLYP OF GALLBLADDER: Status: ACTIVE | Noted: 2020-10-20

## 2021-02-26 ENCOUNTER — TRANSCRIBE ORDER (OUTPATIENT)
Dept: SCHEDULING | Age: 51
End: 2021-02-26

## 2021-02-26 DIAGNOSIS — R10.32 LLQ ABDOMINAL PAIN: Primary | ICD-10-CM

## 2021-03-05 ENCOUNTER — HOSPITAL ENCOUNTER (OUTPATIENT)
Dept: CT IMAGING | Age: 51
Discharge: HOME | End: 2021-03-05
Attending: PHYSICIAN ASSISTANT
Payer: COMMERCIAL

## 2021-03-05 DIAGNOSIS — R10.32 LLQ ABDOMINAL PAIN: ICD-10-CM

## 2021-03-05 PROCEDURE — 74177 CT ABD & PELVIS W/CONTRAST: CPT

## 2021-03-05 PROCEDURE — 74011000636 HC RX REV CODE- 636: Performed by: PHYSICIAN ASSISTANT

## 2021-03-05 PROCEDURE — 74011000258 HC RX REV CODE- 258: Performed by: PHYSICIAN ASSISTANT

## 2021-03-05 RX ORDER — SODIUM CHLORIDE 0.9 % (FLUSH) 0.9 %
10 SYRINGE (ML) INJECTION
Status: COMPLETED | OUTPATIENT
Start: 2021-03-05 | End: 2021-03-05

## 2021-03-05 RX ADMIN — IOPAMIDOL 100 ML: 755 INJECTION, SOLUTION INTRAVENOUS at 09:13

## 2021-03-05 RX ADMIN — SODIUM CHLORIDE 100 ML: 900 INJECTION, SOLUTION INTRAVENOUS at 09:13

## 2021-03-05 RX ADMIN — Medication 10 ML: at 09:14

## 2021-03-05 RX ADMIN — DIATRIZOATE MEGLUMINE AND DIATRIZOATE SODIUM 15 ML: 660; 100 LIQUID ORAL; RECTAL at 09:13

## 2021-04-21 ENCOUNTER — HOSPITAL ENCOUNTER (OUTPATIENT)
Dept: ULTRASOUND IMAGING | Age: 51
Discharge: HOME | End: 2021-04-21
Attending: SURGERY

## 2021-04-21 DIAGNOSIS — K82.4 POLYP OF GALLBLADDER: ICD-10-CM

## 2021-07-01 ENCOUNTER — HOSPITAL ENCOUNTER (OUTPATIENT)
Dept: MAMMOGRAPHY | Age: 51
Discharge: HOME | End: 2021-07-01
Attending: OBSTETRICS & GYNECOLOGY
Payer: COMMERCIAL

## 2021-07-01 DIAGNOSIS — N63.0 BREAST LUMP: ICD-10-CM

## 2021-07-01 PROCEDURE — 76642 ULTRASOUND BREAST LIMITED: CPT

## 2021-07-01 PROCEDURE — 77062 BREAST TOMOSYNTHESIS BI: CPT

## 2022-03-18 PROBLEM — R10.11 RIGHT UPPER QUADRANT ABDOMINAL PAIN: Status: ACTIVE | Noted: 2017-11-02

## 2022-03-18 PROBLEM — D25.2 INTRAMURAL, SUBMUCOUS, AND SUBSEROUS LEIOMYOMA OF UTERUS: Status: ACTIVE | Noted: 2018-07-26

## 2022-03-18 PROBLEM — D25.1 INTRAMURAL, SUBMUCOUS, AND SUBSEROUS LEIOMYOMA OF UTERUS: Status: ACTIVE | Noted: 2018-07-26

## 2022-03-18 PROBLEM — N93.9 ABNORMAL UTERINE BLEEDING: Status: ACTIVE | Noted: 2018-07-26

## 2022-03-18 PROBLEM — N93.9 MENSTRUAL BLEEDING PROBLEM: Status: ACTIVE | Noted: 2018-07-26

## 2022-03-18 PROBLEM — N92.6 MENSTRUAL BLEEDING PROBLEM: Status: ACTIVE | Noted: 2018-07-26

## 2022-03-18 PROBLEM — M79.631 RIGHT FOREARM PAIN: Status: ACTIVE | Noted: 2018-12-12

## 2022-03-18 PROBLEM — D25.0 INTRAMURAL, SUBMUCOUS, AND SUBSEROUS LEIOMYOMA OF UTERUS: Status: ACTIVE | Noted: 2018-07-26

## 2022-03-19 PROBLEM — K82.4 POLYP OF GALLBLADDER: Status: ACTIVE | Noted: 2020-10-20

## 2022-03-19 PROBLEM — N39.46 MIXED INCONTINENCE: Status: ACTIVE | Noted: 2018-10-01

## 2022-03-19 PROBLEM — B37.31 YEAST VAGINITIS: Status: ACTIVE | Noted: 2018-07-26

## 2022-07-05 ENCOUNTER — OFFICE VISIT (OUTPATIENT)
Dept: CARDIOLOGY CLINIC | Age: 52
End: 2022-07-05
Payer: COMMERCIAL

## 2022-07-05 VITALS
DIASTOLIC BLOOD PRESSURE: 60 MMHG | HEART RATE: 62 BPM | BODY MASS INDEX: 23.82 KG/M2 | SYSTOLIC BLOOD PRESSURE: 102 MMHG | HEIGHT: 65 IN | WEIGHT: 143 LBS

## 2022-07-05 DIAGNOSIS — I49.3 VENTRICULAR PREMATURE BEATS: Primary | ICD-10-CM

## 2022-07-05 DIAGNOSIS — R07.2 CHEST PAIN, PRECORDIAL: ICD-10-CM

## 2022-07-05 DIAGNOSIS — E03.9 ACQUIRED HYPOTHYROIDISM: ICD-10-CM

## 2022-07-05 DIAGNOSIS — R06.09 DYSPNEA ON EXERTION: ICD-10-CM

## 2022-07-05 PROCEDURE — 99214 OFFICE O/P EST MOD 30 MIN: CPT | Performed by: INTERNAL MEDICINE

## 2022-07-05 NOTE — PROGRESS NOTES
[address] [place]  [address], [address] [facility], [address] [place] [address]  PHONE: [phone]    SUBJECTIVE: Vashti Ly (1970) is a 46 y.o. female seen for a follow up visit regarding the following:   Specialty Problems        Cardiology Problems    Ventricular premature beats            Patient initially evaluated in 2018 for symptomatic PVCs at that time we had a normal stress echo with no evidence of ischemia. We returned today. Several weeks ago she endorses she was under a good bit of stress and has developed chest pain since then. The symptoms have then improved significantly over the last couple of weeks    7/5/22  Recent stress echo was negative for echocardiographic evidence of ischemia normal ejection fraction. Patient exercised for 9 minutes 51 seconds and had no symptoms    Past Medical History, Past Surgical History, Family history, Social History, and Medications were all reviewed with the patient today and updated as necessary.     Allergies   Allergen Reactions    Latex Other (See Comments)     Patient reports allergy during preop anesthesia assessment     Sulfa Antibiotics Other (See Comments)     dizzy     Past Medical History:   Diagnosis Date    Adverse effect of anesthesia     Difficulty awakening with one surgery     Arthritis     Endocrine disease     thyroidectomy     Fibroid     Gastroesophageal reflux disease     Managed with PRN meds     Nausea & vomiting     Papillary carcinoma of thyroid ([address] 5/14/2013    PVC (premature ventricular contraction)     Yeast vaginitis 7/26/2018     Past Surgical History:   Procedure Laterality Date    HEENT      Thyroidectomy    ESTEVAN AND BSO (CERVIX REMOVED)  12/2018    WISDOM TOOTH EXTRACTION       Family History   Problem Relation Age of Onset    Heart Disease Father     Diabetes Father     No Known Problems Mother     Breast Cancer Neg Hx       Social History     Tobacco Use    Smoking status: Never Smoker    Smokeless tobacco: Never Used   Substance Use Topics    Alcohol use: No       Current Outpatient Medications:     ascorbic acid (VITAMIN C) 500 MG tablet, Take 1,000 mg by mouth daily, Disp: , Rfl:     estradiol (ESTRACE) 0.1 MG/GM vaginal cream, Place 1 g vaginally, Disp: , Rfl:     nystatin (MYCOSTATIN) 515622 units TABS, Take 500,000 Units by mouth 3 times daily, Disp: , Rfl:     thyroid (ARMOUR THYROID) 15 MG tablet, 3 tabs twice daily, then 1 1/2 one daily, Disp: , Rfl:     vitamin E 400 UNIT capsule, Take 400 Units by mouth daily, Disp: , Rfl:     ROS:  Review of Systems - General ROS: negative for - chills, fatigue or fever  Hematological and Lymphatic ROS: negative for - bleeding problems, bruising or jaundice  Respiratory ROS: no cough, shortness of breath, or wheezing  Cardiovascular ROS: no chest pain or dyspnea on exertion  Gastrointestinal ROS: no abdominal pain, change in bowel habits, or black or bloody stools  Neurological ROS: no TIA or stroke symptoms  All other systems negative.     Wt Readings from Last 3 Encounters:   07/05/22 143 lb (64.9 kg)   06/16/22 137 lb (62.1 kg)   04/29/22 137 lb (62.1 kg)     Temp Readings from Last 3 Encounters:   No data found for Temp     BP Readings from Last 3 Encounters:   07/05/22 102/60   06/16/22 120/84   04/29/22 130/85     Pulse Readings from Last 3 Encounters:   07/05/22 62   06/16/22 64   04/29/22 71       PHYSICAL EXAM:  /60   Pulse 62   Ht 5' 5\" (1.651 m)   Wt 143 lb (64.9 kg)   BMI 23.80 kg/m²   Physical Examination: General appearance - alert, well appearing, and in no distress  Mental status - alert, oriented to person, place, and time  Eyes - pupils equal and reactive, extraocular eye movements intact  Neck/lymph - supple, no significant adenopathy  Chest/lungs - clear to auscultation, no wheezes, rales or rhonchi, symmetric air entry  Heart/CV - normal rate, regular rhythm, normal S1, S2, no murmurs, rubs, clicks or gallops  Abdomen/GI - soft, nontender, nondistended, no masses or organomegaly  Musculoskeletal - no joint tenderness, deformity or swelling  Extremities - peripheral pulses normal, no pedal edema, no clubbing or cyanosis  Skin - normal coloration and turgor, no rashes, no suspicious skin lesions noted    EKG: normal EKG, normal sinus rhythm.          Medications reviewed and questions answered    Recent Results (from the past 672 hour(s))   Stress echocardiogram (TTE) exercise with contrast, bubble, strain, and 3D PRN    Collection Time: 06/16/22 11:14 AM   Result Value Ref Range    Stress Target  bpm    Body Surface Area 1.69 m2    LV ESV A4C 20 mL    LV EDV A4C 69 mL    LV ESV A2C 21 mL    LV EDV A2C 78 mL    LV E' Septal Velocity 10 cm/s    LVOT Peak Velocity 1.0 m/s    LV E' Lateral Velocity 10 cm/s    LVPWd 0.7 0.6 - 0.9 cm    LVOT Peak Gradient 4 mmHg    IVSd 0.6 0.6 - 0.9 cm    LVIDs 2.6 cm    LVIDd 3.9 3.9 - 5.3 cm    EF BP 73 55 - 100 %    LV Ejection Fraction A2C 73 %    LV Ejection Fraction A4C 72 %    RVIDd 2.3 cm    LA Diameter 3.7 cm    AV Peak Gradient 4 mmHg    AV Peak Velocity 1.1 m/s    MV E Velocity 1.03 m/s    MV E Wave Deceleration Time 135.0 ms    MV A Velocity 0.42 m/s    TR Max Velocity 2.09 m/s    TR Peak Gradient 18 mmHg    Aortic Root 3.4 cm    MV E/A 2.45     LA/AO Root Ratio 1.09     LVIDd Index 2.32 cm/m2    LVIDs Index 1.55 cm/m2    AV Velocity Ratio 0.91     Ao Root Index 2.02 cm/m2    LA Size Index 2.20 cm/m2    LV Mass 2D 68.2 67 - 162 g    LV Mass 2D Index 40.6 (A) 43 - 95 g/m2    E/E' Lateral 10.30     E/E' Septal 10.30     Fractional Shortening 2D 33 28 - 44 %    E/E' Ratio (Averaged) 10.30     LV RWT Ratio 0.36     LV EDV Index A4C 41 mL/m2    LV EDV Index A2C 46 mL/m2    LV ESV Index A4C 12 mL/m2    LV ESV Index A2C 13 mL/m2    Baseline HR 72 bpm    Baseline Systolic  mmHg    Baseline Diastolic BP 84 mmHg    Stress Peak  bpm    Stress Systolic  mmHg Stress Diastolic BP 70 mmHg    Stress Rate Pressure Product 29,412 bpm*mmHg    Stress Percent HR Achieved 102 %    Exercise Duration Time 9 min    Exercuse Duration Seconds 51 sec    Stress Estimated Workload 12.9 METS    Angina Index 0     RV Basal Dimension 3.2 cm    RV Mid Dimension 1.8 cm    TAPSE 2.4 1.7 cm    Est. RA Pressure 3 mmHg    RVSP 20 mmHg    Baseline ST Depression 0 mm     No results found for: CHOL, CHOLPOCT, CHOLX, CHLST, CHOLV, HDL, HDLPOC, HDLC, LDL, LDLC, VLDLC, VLDL, TGLX, TRIGL    ASSESSMENT and PLAN  No follow-up provider specified. is for   Specialty Problems        Cardiology Problems    Ventricular premature beats               PLAN:  Problem List Items Addressed This Visit        Circulatory    Ventricular premature beats - Primary       Endocrine    Acquired hypothyroidism      Other Visit Diagnoses     Chest pain, precordial        Dyspnea on exertion             Dyspnea on exertion and recent precordial chest pain  Her stress echo is very low risk she had normal ejection fraction good exercise tolerance and no evidence of any valvular heart disease she should continue making appropriate lifestyle choices and risk factor modifications. We discussed the utility of a statin and at this juncture she is not interested in 1    We discussed coronary calcium scoring to delineate if there is any evidence of underlying coronary artery disease but again patient lives quite healthy lifestyle and she has had multiple CAT scans for other reasons and wishes to avoid further radiation.   I have instructed her to follow-up with me as needed if other issues arise in the future    Continue meds as below    Current Outpatient Medications:     ascorbic acid (VITAMIN C) 500 MG tablet, Take 1,000 mg by mouth daily, Disp: , Rfl:     estradiol (ESTRACE) 0.1 MG/GM vaginal cream, Place 1 g vaginally, Disp: , Rfl:     nystatin (MYCOSTATIN) 293125 units TABS, Take 500,000 Units by mouth 3 times daily, Disp: , Rfl:     thyroid (ARMOUR THYROID) 15 MG tablet, 3 tabs twice daily, then 1 1/2 one daily, Disp: , Rfl:     vitamin E 400 UNIT capsule, Take 400 Units by mouth daily, Disp: , Rfl:     Lory Kelly MD  7/5/2022  11:48 AM    Pt is instructed to follow all appropriate cardiac risk factor recommendations and to be compliant with meds and testing. Instructed to follow up appropriately and seek urgent medical care if acute or unstable issues arise.  Results of some tests may be viewed thru [address] but this does not substitute for follow up with MD. If follow up is not scheduled pt is instructed to call for follow up

## 2023-09-29 ENCOUNTER — OFFICE VISIT (OUTPATIENT)
Age: 53
End: 2023-09-29
Payer: COMMERCIAL

## 2023-09-29 VITALS
HEART RATE: 65 BPM | BODY MASS INDEX: 26.49 KG/M2 | WEIGHT: 159 LBS | DIASTOLIC BLOOD PRESSURE: 80 MMHG | SYSTOLIC BLOOD PRESSURE: 132 MMHG | HEIGHT: 65 IN

## 2023-09-29 DIAGNOSIS — R06.09 DYSPNEA ON EXERTION: ICD-10-CM

## 2023-09-29 DIAGNOSIS — E03.9 ACQUIRED HYPOTHYROIDISM: ICD-10-CM

## 2023-09-29 DIAGNOSIS — I49.3 VENTRICULAR PREMATURE BEATS: ICD-10-CM

## 2023-09-29 DIAGNOSIS — R07.2 CHEST PAIN, PRECORDIAL: Primary | ICD-10-CM

## 2023-09-29 PROCEDURE — 93000 ELECTROCARDIOGRAM COMPLETE: CPT | Performed by: INTERNAL MEDICINE

## 2023-09-29 PROCEDURE — 99214 OFFICE O/P EST MOD 30 MIN: CPT | Performed by: INTERNAL MEDICINE

## 2023-09-29 RX ORDER — CHLORAL HYDRATE 500 MG
CAPSULE ORAL DAILY
COMMUNITY

## 2023-09-29 NOTE — PROGRESS NOTES
[address] [place]  [address] [facility], [facility], [address]  PHONE: [phone]    SUBJECTIVE: Jeancarlos Tony (1970) is a 52 y.o. female seen for a follow up visit regarding the following:   Specialty Problems          Cardiology Problems    Ventricular premature beats              Patient initially evaluated in 2018 for symptomatic PVCs at that time we had a normal stress echo with no evidence of ischemia. We returned today. Several weeks ago she endorses she was under a good bit of stress and has developed chest pain since then. The symptoms have then improved significantly over the last couple of weeks    7/5/22  Recent stress echo was negative for echocardiographic evidence of ischemia normal ejection fraction. Patient exercised for 9 minutes 51 seconds and had no symptoms    9/29/23  One episode of cp a week ago during a stressful situation cp has subsequently resolved EKG is normal sinus rhythm and is unchanged from a year ago    Past Medical History, Past Surgical History, Family history, Social History, and Medications were all reviewed with the patient today and updated as necessary.     Allergies   Allergen Reactions    Latex Other (See Comments)     Patient reports allergy during preop anesthesia assessment     Sulfa Antibiotics Other (See Comments)     dizzy    Sulfamethoxazole-Trimethoprim      Past Medical History:   Diagnosis Date    Adverse effect of anesthesia     Difficulty awakening with one surgery     Arthritis     Endocrine disease     thyroidectomy     Fibroid     Gastroesophageal reflux disease     Managed with PRN meds     Nausea & vomiting     Papillary carcinoma of thyroid ([address]) 5/14/2013    PVC (premature ventricular contraction)     Yeast vaginitis 7/26/2018     Past Surgical History:   Procedure Laterality Date    HEENT      Thyroidectomy    ESTEVAN AND BSO (CERVIX REMOVED)  12/2018    WISDOM TOOTH EXTRACTION       Family History   Problem Relation Age of

## 2024-01-19 ENCOUNTER — HOSPITAL ENCOUNTER (OUTPATIENT)
Dept: MAMMOGRAPHY | Age: 54
Discharge: HOME | End: 2024-01-19
Attending: INTERNAL MEDICINE
Payer: COMMERCIAL

## 2024-01-19 ENCOUNTER — HOSPITAL ENCOUNTER (OUTPATIENT)
Dept: ULTRASOUND IMAGING | Age: 54
End: 2024-01-19
Attending: INTERNAL MEDICINE
Payer: COMMERCIAL

## 2024-01-19 VITALS — WEIGHT: 160 LBS | BODY MASS INDEX: 27.31 KG/M2 | HEIGHT: 64 IN

## 2024-01-19 DIAGNOSIS — K82.4 GALL BLADDER POLYP: ICD-10-CM

## 2024-01-19 DIAGNOSIS — Z12.31 ENCOUNTER FOR SCREENING MAMMOGRAM FOR MALIGNANT NEOPLASM OF BREAST: ICD-10-CM

## 2024-01-19 PROCEDURE — 77063 BREAST TOMOSYNTHESIS BI: CPT

## 2024-01-19 PROCEDURE — 76700 US EXAM ABDOM COMPLETE: CPT

## 2024-07-25 ENCOUNTER — PREP FOR PROCEDURE (OUTPATIENT)
Dept: SURGERY | Age: 54
End: 2024-07-25

## 2024-07-25 ENCOUNTER — OFFICE VISIT (OUTPATIENT)
Dept: SURGERY | Age: 54
End: 2024-07-25
Payer: COMMERCIAL

## 2024-07-25 VITALS
HEIGHT: 64 IN | WEIGHT: 160 LBS | HEART RATE: 72 BPM | BODY MASS INDEX: 27.31 KG/M2 | DIASTOLIC BLOOD PRESSURE: 99 MMHG | SYSTOLIC BLOOD PRESSURE: 139 MMHG

## 2024-07-25 DIAGNOSIS — K82.4 POLYP OF GALLBLADDER: Primary | ICD-10-CM

## 2024-07-25 DIAGNOSIS — K81.9 CHOLECYSTITIS, UNSPECIFIED: ICD-10-CM

## 2024-07-25 DIAGNOSIS — K81.0 ACUTE ACALCULOUS CHOLECYSTITIS: ICD-10-CM

## 2024-07-25 PROCEDURE — 99203 OFFICE O/P NEW LOW 30 MIN: CPT | Performed by: SURGERY

## 2024-07-25 NOTE — PROGRESS NOTES
[facility]  [address] [facility], [address]  [place], [place] [id number] [phone]    Office Note/H&P/Consult Note   Parul Olson   MRN: [id number]     : 1970        HPI: Parul Olson is a 53 y.o. female who is here to see me today about her gallbladder.  I saw her back in 2016.  She had an incidentally found gallbladder polyp on the CT scan and confirmed with ultrasound.  I had ordered a repeat ultrasound in 6 months to follow-up.  This was not done.  She is here because she feels she is symptomatic from her gallbladder.  She is having pain in the right upper quadrant radiating between her shoulder blades.  This happens after eating.  Definitely associated with fatty foods.  On a scale of 1-10 it is consistently a 3.  No jaundice.  No change in color of urine or stool.  Recent abdominal ultrasound shows a 1.1 x 0.6 x 0.6 cm gallbladder polyp at the fundus of the gallbladder.  No stones.  She has a known right adrenal adenoma.        Past Medical History:   Diagnosis Date    Adverse effect of anesthesia     Difficulty awakening with one surgery     Arthritis     Endocrine disease     thyroidectomy     Fibroid     Gastroesophageal reflux disease     Managed with PRN meds     Nausea & vomiting     Papillary carcinoma of thyroid (H[facility]) 2013    PVC (premature ventricular contraction)     Yeast vaginitis 2018     Past Surgical History:   Procedure Laterality Date    HEENT      Thyroidectomy    HYSTERECTOMY (CERVIX STATUS UNKNOWN)      ESTEVAN AND BSO (CERVIX REMOVED)  2018    WISDOM TOOTH EXTRACTION       Current Outpatient Medications   Medication Sig    Omega-3 Fatty Acids (FISH OIL) 1000 MG capsule Take by mouth daily    Multiple Vitamin (MULTI-VITAMIN DAILY PO) Take by mouth    ascorbic acid (VITAMIN C) 500 MG tablet Take 2 tablets by mouth daily    estradiol (ESTRACE) 0.1 MG/GM vaginal cream Place 1 g vaginally    thyroid (ARMOUR THYROID) 15 MG tablet 3 tabs twice daily, then

## 2024-08-17 RX ORDER — INDOCYANINE GREEN AND WATER 25 MG
5 KIT INJECTION ONCE
Status: CANCELLED | OUTPATIENT
Start: 2024-08-17 | End: 2024-08-17

## 2024-08-17 RX ORDER — SODIUM CHLORIDE 0.9 % (FLUSH) 0.9 %
5-40 SYRINGE (ML) INJECTION PRN
Status: CANCELLED | OUTPATIENT
Start: 2024-08-17

## 2024-08-17 RX ORDER — SODIUM CHLORIDE 9 MG/ML
INJECTION, SOLUTION INTRAVENOUS PRN
Status: CANCELLED | OUTPATIENT
Start: 2024-08-17

## 2024-08-17 RX ORDER — SODIUM CHLORIDE 0.9 % (FLUSH) 0.9 %
5-40 SYRINGE (ML) INJECTION EVERY 12 HOURS SCHEDULED
Status: CANCELLED | OUTPATIENT
Start: 2024-08-17

## 2024-09-06 RX ORDER — ESTRADIOL 0.1 MG/G
2 CREAM VAGINAL
COMMUNITY

## 2024-09-06 RX ORDER — THYROID,PORK 100 % USP
140 POWDER (GRAM) MISCELLANEOUS
COMMUNITY

## 2024-09-15 ENCOUNTER — ANESTHESIA EVENT (OUTPATIENT)
Dept: SURGERY | Age: 54
End: 2024-09-15
Payer: COMMERCIAL

## 2024-09-16 ENCOUNTER — ANESTHESIA (OUTPATIENT)
Dept: SURGERY | Age: 54
End: 2024-09-16
Payer: COMMERCIAL

## 2024-09-16 ENCOUNTER — HOSPITAL ENCOUNTER (OUTPATIENT)
Age: 54
Setting detail: OUTPATIENT SURGERY
Discharge: HOME | End: 2024-09-16
Attending: SURGERY | Admitting: SURGERY
Payer: COMMERCIAL

## 2024-09-16 VITALS
HEART RATE: 79 BPM | HEIGHT: 65 IN | DIASTOLIC BLOOD PRESSURE: 69 MMHG | WEIGHT: 163 LBS | SYSTOLIC BLOOD PRESSURE: 137 MMHG | BODY MASS INDEX: 27.16 KG/M2 | OXYGEN SATURATION: 99 % | RESPIRATION RATE: 18 BRPM | TEMPERATURE: 97.7 F

## 2024-09-16 DIAGNOSIS — K81.0 ACUTE ACALCULOUS CHOLECYSTITIS: Primary | ICD-10-CM

## 2024-09-16 PROCEDURE — 6370000000 HC RX 637 (ALT 250 FOR IP): Performed by: ANESTHESIOLOGY

## 2024-09-16 PROCEDURE — 2580000003 HC RX 258: Performed by: ANESTHESIOLOGY

## 2024-09-16 PROCEDURE — 6360000002 HC RX W HCPCS: Performed by: SURGERY

## 2024-09-16 PROCEDURE — 7100000001 HC PACU RECOVERY - ADDTL 15 MIN: Performed by: SURGERY

## 2024-09-16 PROCEDURE — 2580000003 HC RX 258: Performed by: SURGERY

## 2024-09-16 PROCEDURE — 6360000002 HC RX W HCPCS: Performed by: NURSE ANESTHETIST, CERTIFIED REGISTERED

## 2024-09-16 PROCEDURE — 7100000000 HC PACU RECOVERY - FIRST 15 MIN: Performed by: SURGERY

## 2024-09-16 PROCEDURE — 88304 TISSUE EXAM BY PATHOLOGIST: CPT

## 2024-09-16 PROCEDURE — 47562 LAPAROSCOPIC CHOLECYSTECTOMY: CPT | Performed by: SURGERY

## 2024-09-16 PROCEDURE — 7100000011 HC PHASE II RECOVERY - ADDTL 15 MIN: Performed by: SURGERY

## 2024-09-16 PROCEDURE — 2500000003 HC RX 250 WO HCPCS: Performed by: NURSE ANESTHETIST, CERTIFIED REGISTERED

## 2024-09-16 PROCEDURE — 2500000003 HC RX 250 WO HCPCS: Performed by: SURGERY

## 2024-09-16 PROCEDURE — S2900 ROBOTIC SURGICAL SYSTEM: HCPCS | Performed by: SURGERY

## 2024-09-16 PROCEDURE — 3700000000 HC ANESTHESIA ATTENDED CARE: Performed by: SURGERY

## 2024-09-16 PROCEDURE — 6360000002 HC RX W HCPCS: Performed by: ANESTHESIOLOGY

## 2024-09-16 PROCEDURE — 3700000001 HC ADD 15 MINUTES (ANESTHESIA): Performed by: SURGERY

## 2024-09-16 PROCEDURE — 3600000009 HC SURGERY ROBOT BASE: Performed by: SURGERY

## 2024-09-16 PROCEDURE — 2709999900 HC NON-CHARGEABLE SUPPLY: Performed by: SURGERY

## 2024-09-16 PROCEDURE — 3600000019 HC SURGERY ROBOT ADDTL 15MIN: Performed by: SURGERY

## 2024-09-16 PROCEDURE — 7100000010 HC PHASE II RECOVERY - FIRST 15 MIN: Performed by: SURGERY

## 2024-09-16 RX ORDER — DEXAMETHASONE SODIUM PHOSPHATE 10 MG/ML
INJECTION INTRAMUSCULAR; INTRAVENOUS
Status: DISCONTINUED | OUTPATIENT
Start: 2024-09-16 | End: 2024-09-16 | Stop reason: SDUPTHER

## 2024-09-16 RX ORDER — EPHEDRINE SULFATE 5 MG/ML
INJECTION INTRAVENOUS
Status: DISCONTINUED | OUTPATIENT
Start: 2024-09-16 | End: 2024-09-16 | Stop reason: SDUPTHER

## 2024-09-16 RX ORDER — PROPOFOL 10 MG/ML
INJECTION, EMULSION INTRAVENOUS
Status: DISCONTINUED | OUTPATIENT
Start: 2024-09-16 | End: 2024-09-16 | Stop reason: SDUPTHER

## 2024-09-16 RX ORDER — OXYCODONE HYDROCHLORIDE 5 MG/1
5 TABLET ORAL
Status: COMPLETED | OUTPATIENT
Start: 2024-09-16 | End: 2024-09-16

## 2024-09-16 RX ORDER — LIDOCAINE HYDROCHLORIDE 20 MG/ML
INJECTION, SOLUTION EPIDURAL; INFILTRATION; INTRACAUDAL; PERINEURAL
Status: DISCONTINUED | OUTPATIENT
Start: 2024-09-16 | End: 2024-09-16 | Stop reason: SDUPTHER

## 2024-09-16 RX ORDER — INDOCYANINE GREEN AND WATER 25 MG
5 KIT INJECTION ONCE
Status: COMPLETED | OUTPATIENT
Start: 2024-09-16 | End: 2024-09-16

## 2024-09-16 RX ORDER — SODIUM CHLORIDE 0.9 % (FLUSH) 0.9 %
5-40 SYRINGE (ML) INJECTION EVERY 12 HOURS SCHEDULED
Status: DISCONTINUED | OUTPATIENT
Start: 2024-09-16 | End: 2024-09-16 | Stop reason: HOSPADM

## 2024-09-16 RX ORDER — BUPIVACAINE HYDROCHLORIDE AND EPINEPHRINE 5; 5 MG/ML; UG/ML
INJECTION, SOLUTION EPIDURAL; INTRACAUDAL; PERINEURAL PRN
Status: DISCONTINUED | OUTPATIENT
Start: 2024-09-16 | End: 2024-09-16 | Stop reason: ALTCHOICE

## 2024-09-16 RX ORDER — ACETAMINOPHEN 500 MG
1000 TABLET ORAL ONCE
Status: COMPLETED | OUTPATIENT
Start: 2024-09-16 | End: 2024-09-16

## 2024-09-16 RX ORDER — LIDOCAINE HYDROCHLORIDE 10 MG/ML
1 INJECTION, SOLUTION INFILTRATION; PERINEURAL
Status: DISCONTINUED | OUTPATIENT
Start: 2024-09-16 | End: 2024-09-16 | Stop reason: HOSPADM

## 2024-09-16 RX ORDER — HYDROMORPHONE HYDROCHLORIDE 2 MG/ML
0.25 INJECTION, SOLUTION INTRAMUSCULAR; INTRAVENOUS; SUBCUTANEOUS EVERY 5 MIN PRN
Status: DISCONTINUED | OUTPATIENT
Start: 2024-09-16 | End: 2024-09-16 | Stop reason: HOSPADM

## 2024-09-16 RX ORDER — PROCHLORPERAZINE EDISYLATE 5 MG/ML
5 INJECTION INTRAMUSCULAR; INTRAVENOUS
Status: DISCONTINUED | OUTPATIENT
Start: 2024-09-16 | End: 2024-09-16 | Stop reason: HOSPADM

## 2024-09-16 RX ORDER — SODIUM CHLORIDE, SODIUM LACTATE, POTASSIUM CHLORIDE, CALCIUM CHLORIDE 600; 310; 30; 20 MG/100ML; MG/100ML; MG/100ML; MG/100ML
INJECTION, SOLUTION INTRAVENOUS CONTINUOUS
Status: DISCONTINUED | OUTPATIENT
Start: 2024-09-16 | End: 2024-09-16 | Stop reason: HOSPADM

## 2024-09-16 RX ORDER — NALOXONE HYDROCHLORIDE 0.4 MG/ML
INJECTION, SOLUTION INTRAMUSCULAR; INTRAVENOUS; SUBCUTANEOUS PRN
Status: DISCONTINUED | OUTPATIENT
Start: 2024-09-16 | End: 2024-09-16 | Stop reason: HOSPADM

## 2024-09-16 RX ORDER — FENTANYL CITRATE 50 UG/ML
INJECTION, SOLUTION INTRAMUSCULAR; INTRAVENOUS
Status: DISCONTINUED | OUTPATIENT
Start: 2024-09-16 | End: 2024-09-16 | Stop reason: SDUPTHER

## 2024-09-16 RX ORDER — HYDRALAZINE HYDROCHLORIDE 20 MG/ML
10 INJECTION INTRAMUSCULAR; INTRAVENOUS
Status: DISCONTINUED | OUTPATIENT
Start: 2024-09-16 | End: 2024-09-16 | Stop reason: HOSPADM

## 2024-09-16 RX ORDER — SODIUM CHLORIDE 0.9 % (FLUSH) 0.9 %
5-40 SYRINGE (ML) INJECTION PRN
Status: DISCONTINUED | OUTPATIENT
Start: 2024-09-16 | End: 2024-09-16 | Stop reason: HOSPADM

## 2024-09-16 RX ORDER — OXYCODONE AND ACETAMINOPHEN 5; 325 MG/1; MG/1
1 TABLET ORAL EVERY 6 HOURS PRN
Qty: 20 TABLET | Refills: 0 | Status: SHIPPED | OUTPATIENT
Start: 2024-09-16 | End: 2024-09-21

## 2024-09-16 RX ORDER — MIDAZOLAM HYDROCHLORIDE 1 MG/ML
INJECTION INTRAMUSCULAR; INTRAVENOUS
Status: DISCONTINUED | OUTPATIENT
Start: 2024-09-16 | End: 2024-09-16 | Stop reason: SDUPTHER

## 2024-09-16 RX ORDER — ONDANSETRON 2 MG/ML
4 INJECTION INTRAMUSCULAR; INTRAVENOUS ONCE
Status: COMPLETED | OUTPATIENT
Start: 2024-09-16 | End: 2024-09-16

## 2024-09-16 RX ORDER — MIDAZOLAM HYDROCHLORIDE 2 MG/2ML
2 INJECTION, SOLUTION INTRAMUSCULAR; INTRAVENOUS
Status: DISCONTINUED | OUTPATIENT
Start: 2024-09-16 | End: 2024-09-16 | Stop reason: HOSPADM

## 2024-09-16 RX ORDER — APREPITANT 40 MG/1
40 CAPSULE ORAL ONCE
Status: DISCONTINUED | OUTPATIENT
Start: 2024-09-16 | End: 2024-09-16

## 2024-09-16 RX ORDER — HYDROMORPHONE HYDROCHLORIDE 2 MG/ML
0.5 INJECTION, SOLUTION INTRAMUSCULAR; INTRAVENOUS; SUBCUTANEOUS EVERY 5 MIN PRN
Status: DISCONTINUED | OUTPATIENT
Start: 2024-09-16 | End: 2024-09-16 | Stop reason: HOSPADM

## 2024-09-16 RX ORDER — ROCURONIUM BROMIDE 10 MG/ML
INJECTION, SOLUTION INTRAVENOUS
Status: DISCONTINUED | OUTPATIENT
Start: 2024-09-16 | End: 2024-09-16 | Stop reason: SDUPTHER

## 2024-09-16 RX ORDER — SODIUM CHLORIDE 9 MG/ML
INJECTION, SOLUTION INTRAVENOUS PRN
Status: DISCONTINUED | OUTPATIENT
Start: 2024-09-16 | End: 2024-09-16 | Stop reason: HOSPADM

## 2024-09-16 RX ORDER — ONDANSETRON 2 MG/ML
4 INJECTION INTRAMUSCULAR; INTRAVENOUS
Status: DISCONTINUED | OUTPATIENT
Start: 2024-09-16 | End: 2024-09-16 | Stop reason: HOSPADM

## 2024-09-16 RX ORDER — ONDANSETRON 2 MG/ML
INJECTION INTRAMUSCULAR; INTRAVENOUS
Status: DISCONTINUED | OUTPATIENT
Start: 2024-09-16 | End: 2024-09-16 | Stop reason: SDUPTHER

## 2024-09-16 RX ORDER — FENTANYL CITRATE 50 UG/ML
100 INJECTION, SOLUTION INTRAMUSCULAR; INTRAVENOUS
Status: DISCONTINUED | OUTPATIENT
Start: 2024-09-16 | End: 2024-09-16 | Stop reason: HOSPADM

## 2024-09-16 RX ORDER — LABETALOL HYDROCHLORIDE 5 MG/ML
10 INJECTION, SOLUTION INTRAVENOUS
Status: DISCONTINUED | OUTPATIENT
Start: 2024-09-16 | End: 2024-09-16 | Stop reason: HOSPADM

## 2024-09-16 RX ADMIN — Medication 2000 MG: at 14:00

## 2024-09-16 RX ADMIN — SODIUM CHLORIDE 150 MG: 9 INJECTION, SOLUTION INTRAVENOUS at 12:31

## 2024-09-16 RX ADMIN — OXYCODONE HYDROCHLORIDE 5 MG: 5 TABLET ORAL at 15:31

## 2024-09-16 RX ADMIN — ROCURONIUM BROMIDE 50 MG: 10 INJECTION, SOLUTION INTRAVENOUS at 13:53

## 2024-09-16 RX ADMIN — SUGAMMADEX 200 MG: 100 INJECTION, SOLUTION INTRAVENOUS at 14:42

## 2024-09-16 RX ADMIN — FENTANYL CITRATE 100 MCG: 50 INJECTION, SOLUTION INTRAMUSCULAR; INTRAVENOUS at 13:52

## 2024-09-16 RX ADMIN — DEXAMETHASONE SODIUM PHOSPHATE 10 MG: 10 INJECTION INTRAMUSCULAR; INTRAVENOUS at 13:58

## 2024-09-16 RX ADMIN — HYDROMORPHONE HYDROCHLORIDE 0.5 MG: 2 INJECTION INTRAMUSCULAR; INTRAVENOUS; SUBCUTANEOUS at 16:35

## 2024-09-16 RX ADMIN — INDOCYANINE GREEN AND WATER 5 MG: KIT at 12:10

## 2024-09-16 RX ADMIN — PROPOFOL 200 MG: 10 INJECTION, EMULSION INTRAVENOUS at 13:52

## 2024-09-16 RX ADMIN — SODIUM CHLORIDE, POTASSIUM CHLORIDE, SODIUM LACTATE AND CALCIUM CHLORIDE: 600; 310; 30; 20 INJECTION, SOLUTION INTRAVENOUS at 12:09

## 2024-09-16 RX ADMIN — LIDOCAINE HYDROCHLORIDE 100 MG: 20 INJECTION, SOLUTION EPIDURAL; INFILTRATION; INTRACAUDAL; PERINEURAL at 13:52

## 2024-09-16 RX ADMIN — MIDAZOLAM 2 MG: 1 INJECTION INTRAMUSCULAR; INTRAVENOUS at 13:47

## 2024-09-16 RX ADMIN — EPHEDRINE SULFATE 10 MG: 5 INJECTION INTRAVENOUS at 13:57

## 2024-09-16 RX ADMIN — ONDANSETRON 4 MG: 2 INJECTION INTRAMUSCULAR; INTRAVENOUS at 13:58

## 2024-09-16 RX ADMIN — ACETAMINOPHEN 1000 MG: 500 TABLET, FILM COATED ORAL at 12:10

## 2024-09-16 RX ADMIN — ONDANSETRON 4 MG: 2 INJECTION INTRAMUSCULAR; INTRAVENOUS at 12:25

## 2024-09-16 ASSESSMENT — PAIN - FUNCTIONAL ASSESSMENT
PAIN_FUNCTIONAL_ASSESSMENT: 0-10

## 2024-09-16 ASSESSMENT — PAIN DESCRIPTION - DESCRIPTORS
DESCRIPTORS: SORE
DESCRIPTORS: SORE
DESCRIPTORS: ACHING

## 2024-09-16 ASSESSMENT — PAIN DESCRIPTION - ORIENTATION
ORIENTATION: ANTERIOR
ORIENTATION: ANTERIOR

## 2024-09-16 ASSESSMENT — PAIN SCALES - GENERAL
PAINLEVEL_OUTOF10: 4
PAINLEVEL_OUTOF10: 7

## 2024-09-16 ASSESSMENT — PAIN DESCRIPTION - LOCATION
LOCATION: ABDOMEN
LOCATION: ABDOMEN

## 2024-09-24 ENCOUNTER — OFFICE VISIT (OUTPATIENT)
Dept: SURGERY | Age: 54
End: 2024-09-24

## 2024-09-24 VITALS — BODY MASS INDEX: 27.16 KG/M2 | HEIGHT: 65 IN | WEIGHT: 163 LBS

## 2024-09-24 DIAGNOSIS — Z09 POSTOPERATIVE EXAMINATION: Primary | ICD-10-CM

## 2024-09-24 DIAGNOSIS — K81.0 ACUTE ACALCULOUS CHOLECYSTITIS: ICD-10-CM

## 2024-09-24 PROBLEM — K82.4 POLYP OF GALLBLADDER: Status: RESOLVED | Noted: 2020-10-20 | Resolved: 2024-09-24

## 2024-09-24 PROCEDURE — 99024 POSTOP FOLLOW-UP VISIT: CPT

## 2024-10-08 ENCOUNTER — OFFICE VISIT (OUTPATIENT)
Dept: SURGERY | Age: 54
End: 2024-10-08

## 2024-10-08 VITALS — HEIGHT: 64 IN | BODY MASS INDEX: 27.83 KG/M2 | WEIGHT: 163 LBS

## 2024-10-08 DIAGNOSIS — Z48.89 POSTOPERATIVE VISIT: Primary | ICD-10-CM

## 2024-10-08 PROCEDURE — 99024 POSTOP FOLLOW-UP VISIT: CPT

## 2024-10-08 RX ORDER — DOXYCYCLINE HYCLATE 100 MG
100 TABLET ORAL 2 TIMES DAILY
Qty: 10 TABLET | Refills: 0 | Status: SHIPPED | OUTPATIENT
Start: 2024-10-08 | End: 2024-10-13

## 2024-10-08 NOTE — PROGRESS NOTES
[address] [address]  [phone]        poDate: 10/8/2024      Name: Parul Olson      MRN: [id number]       : 1970       Age: 54 y.o.    Sex: female        Jewell Edge MD       CC:    Chief Complaint   Patient presents with    Post-Op Check       HPI:  The patient presents for a 2nd post-op visit s/p Robotic cholecystectomy. 24 Bashir Cole Jr, MD      Pt with complaints of serous drainage and incision \"coming apart\" left trocar site. Pt complains of pulling sensation underneath left trocar site when bending over.      Physical Exam:     Ht 1.626 m (5' 4\")   Wt 73.9 kg (163 lb)   BMI 27.98 kg/m²     General: Alert, oriented, cooperative and in no acute distress.   Neck: Supple, trachea midline, no appreciable thyromegaly  Resp: No JVD.  Breathing is  non-labored. Lungs clear to auscultation without wheezing or rhonchi   CV: RRR. No murmurs, rubs or gallops appreciated.  Abd: soft non-tender and non-distended without peritoneal signs. +bs    Skin/incision:  left trocar site with erythema and scant serous drainage. All other trocar sites are Clean, dry and intact with no signs of infection.   Applied silver nitrate to left trocar site.     10/8/24 Left trocar site (s/p cholecystectomy 24)   .    Assessment/Plan:  Parul Olson is a 54 y.o. female who is s/p  Robotic cholecystectomy. 24 Bashir Cole Jr, MD  .    1. Follow-up as needed    2. Lift nothing heavier than 25 lbs for 4 weeks after surgery.   Wear an abdominal binder when out of bed for 1 week.      4 weeks after surgery, Return to full activity    3. Regular diet  4. 5 day course of doxycycline escribed. Pt will call the office if redness and drainage have not improved by day 3 of antibiotics.   5. Showers allowed. May shower tomorrow 10/9/24. Gently wash incisions with antibacterial soap and pat dry.   Signed: STEVE Chase NP    10/8/2024  4:17 PM

## 2025-03-31 ENCOUNTER — OFFICE VISIT (OUTPATIENT)
Age: 55
End: 2025-03-31
Payer: COMMERCIAL

## 2025-03-31 VITALS
SYSTOLIC BLOOD PRESSURE: 120 MMHG | HEIGHT: 64 IN | DIASTOLIC BLOOD PRESSURE: 82 MMHG | HEART RATE: 69 BPM | BODY MASS INDEX: 24.59 KG/M2 | WEIGHT: 144 LBS

## 2025-03-31 DIAGNOSIS — I49.3 VENTRICULAR PREMATURE BEATS: ICD-10-CM

## 2025-03-31 DIAGNOSIS — R06.09 DYSPNEA ON EXERTION: ICD-10-CM

## 2025-03-31 DIAGNOSIS — R07.2 CHEST PAIN, PRECORDIAL: ICD-10-CM

## 2025-03-31 DIAGNOSIS — C73 PAPILLARY CARCINOMA OF THYROID: Primary | ICD-10-CM

## 2025-03-31 PROCEDURE — 93000 ELECTROCARDIOGRAM COMPLETE: CPT | Performed by: INTERNAL MEDICINE

## 2025-03-31 PROCEDURE — 99214 OFFICE O/P EST MOD 30 MIN: CPT | Performed by: INTERNAL MEDICINE

## 2025-03-31 NOTE — PROGRESS NOTES
[facility] CARDIOLOGY, [address], [address]  [place] [address]  PHONE: [phone]    SUBJECTIVE: /HPI  Parulblanche Olson (1970) is a 54 y.o. female seen for a follow up visit regarding the following:   Specialty Problems          Cardiology Problems    Ventricular premature beats         Mrs Olson is a 54 year old female who presents for a follow-up visit. She does not endorse any chest pain, shortness of breath, or palpitations. She states that she has numbness in the tips of her toes starting a couple of months ago and swelling in her feet. She has never had a lower Duplex US.     She has never had a sleep study but does endorse difficulty sleeping.    She has a history of PVCs but says she hasn't been having a problem with them recently, she has never worn a monitor.      Past Medical History, Past Surgical History, Family history, Social History, and Medications were all reviewed with the patient today and updated as necessary.    Allergies   Allergen Reactions    Latex Other (See Comments)     Patient reports allergy during preop anesthesia assessment     Sulfa Antibiotics Other (See Comments)     dizzy    Sulfamethoxazole-Trimethoprim      Past Medical History:   Diagnosis Date    Adverse effect of anesthesia     Difficulty awakening with one surgery     Arthritis     Endocrine disease     thyroidectomy     Fibroid     Gastroesophageal reflux disease     Managed with PRN meds     Nausea & vomiting     Papillary carcinoma of thyroid (HCC) 5/14/2013    Polyp of gallbladder 10/20/2020    PVC (premature ventricular contraction)     Yeast vaginitis 7/26/2018     Past Surgical History:   Procedure Laterality Date    CHOLECYSTECTOMY, LAPAROSCOPIC N/A 9/16/2024    ROBOTIC CHOLECYSTECTOMY performed by Bashir Cole Jr., MD at [facility] MAIN OR    HEENT      Thyroidectomy    HYSTERECTOMY (CERVIX STATUS UNKNOWN)      ESTEVAN AND BSO (CERVIX REMOVED)  12/2018    WISDOM TOOTH EXTRACTION       Family History

## (undated) DEVICE — SOLUTION IRRIG 1000ML H2O STRL BLT

## (undated) DEVICE — INTENDED FOR TISSUE SEPARATION, AND OTHER PROCEDURES THAT REQUIRE A SHARP SURGICAL BLADE TO PUNCTURE OR CUT.: Brand: BARD-PARKER ® STAINLESS STEEL BLADES

## (undated) DEVICE — MEGASUTURECUT ND: Brand: ENDOWRIST;DAVINCI SI

## (undated) DEVICE — NEEDLE HYPO 21GA L1.5IN INTRAMUSCULAR S STL LATCH BVL UP

## (undated) DEVICE — APPLICATOR SEAL FLOSEAL 5MM+ --

## (undated) DEVICE — CONTAINER SPEC HISTOLOGY 900ML POLYPR

## (undated) DEVICE — SUTURE MONOCRYL SZ 3-0 L27IN ABSRB UD L19MM PS-2 3/8 CIR PRIM Y427H

## (undated) DEVICE — CATHETER URETH 16FR BLLN 5CC L16IN SIL F INDWL 2 W SHT LEN

## (undated) DEVICE — SEAL

## (undated) DEVICE — TRI-LUMEN FILTERED TUBE SET WITH ACTIVATED CHARCOAL FILTER: Brand: AIRSEAL

## (undated) DEVICE — KENDALL SCD EXPRESS SLEEVES, KNEE LENGTH, MEDIUM: Brand: KENDALL SCD

## (undated) DEVICE — AIRSEAL 8 MM ACCESS PORT AND LOW PROFILE OBTURATOR WITH BLADELESS OPTICAL TIP, 120 MM LENGTH: Brand: AIRSEAL

## (undated) DEVICE — TIP COVER ACCESSORY

## (undated) DEVICE — 2000CC GUARDIAN II: Brand: GUARDIAN

## (undated) DEVICE — SPONGE LAP 18X18IN STRL -- 5/PK

## (undated) DEVICE — 2, DISPOSABLE SUCTION/IRRIGATOR WITHOUT DISPOSABLE TIP: Brand: STRYKEFLOW

## (undated) DEVICE — REM POLYHESIVE ADULT PATIENT RETURN ELECTRODE: Brand: VALLEYLAB

## (undated) DEVICE — JELLY LUBRICATING 10GM PREFIL SYR LUBE

## (undated) DEVICE — CANNULA SEAL

## (undated) DEVICE — COVER,TABLE,44X76,STERILE: Brand: MEDLINE

## (undated) DEVICE — GENERAL LAPAROSCOPY: Brand: MEDLINE INDUSTRIES, INC.

## (undated) DEVICE — BAG DRNGE 4000ML CONT IRRIG ROUNDED TEARDROP SHP DISP

## (undated) DEVICE — BLADELESS OPTICAL TROCAR WITH FIXATION CANNULA: Brand: VERSAONE

## (undated) DEVICE — SUTURE SZ 0 27IN 5/8 CIR UR-6  TAPER PT VIOLET ABSRB VICRYL J603H

## (undated) DEVICE — FLOSEAL HEMOSTATIC MATRIX, 5 ML: Brand: FLOSEAL

## (undated) DEVICE — VCARE MEDIUM, UTERINE MANIPULATOR, VAGINAL-CERVICAL-AHLUWALIA'S-RETRACTOR-ELEVATOR: Brand: VCARE

## (undated) DEVICE — SYR BULB 60ML IRRIGATION -- CONVERT TO ITEM 116413

## (undated) DEVICE — STERILE LATEX POWDER FREE SURGICAL GLOVES WITH HYDROGEL COATING: Brand: PROTEXIS

## (undated) DEVICE — ANCHOR TISSUE RETRIEVAL SYSTEM, BAG SIZE 125 ML, PORT SIZE 8 MM: Brand: ANCHOR TISSUE RETRIEVAL SYSTEM

## (undated) DEVICE — ARM DRAPE

## (undated) DEVICE — INSUFFLATION NEEDLE: Brand: SURGINEEDLE

## (undated) DEVICE — APPLICATOR FBR TIP L6IN COT TIP WOOD SHFT SWAB 2000 PER CA

## (undated) DEVICE — LIQUIBAND RAPID ADHESIVE 36/CS 0.8ML: Brand: MEDLINE

## (undated) DEVICE — PK DISSECTING FORCEPS: Brand: ENDOWRIST;DAVINCI SI

## (undated) DEVICE — GOWN,REINF,POLY,ECL,PP SLV,XL: Brand: MEDLINE

## (undated) DEVICE — 3M™ TEGADERM™ TRANSPARENT FILM DRESSING FRAME STYLE, 1624W, 2-3/8 IN X 2-3/4 IN (6 CM X 7 CM), 100/CT 4CT/CASE: Brand: 3M™ TEGADERM™

## (undated) DEVICE — VISUALIZATION SYSTEM: Brand: CLEARIFY

## (undated) DEVICE — COLUMN DRAPE

## (undated) DEVICE — SUTURE VCRL SZ 4-0 L27IN ABSRB UD L19MM PS-2 3/8 CIR PRIM J426H

## (undated) DEVICE — LEGGINGS, PAIR, 31X48, STERILE: Brand: MEDLINE

## (undated) DEVICE — (D)PREP SKN CHLRAPRP APPL 26ML -- CONVERT TO ITEM 371833

## (undated) DEVICE — 3M™ TEGADERM™ TRANSPARENT FILM DRESSING FRAME STYLE, 1626W, 4 IN X 4-3/4 IN (10 CM X 12 CM), 50/CT 4CT/CASE: Brand: 3M™ TEGADERM™

## (undated) DEVICE — OBTRTR BLDELSS 8MM DISP -- DA VINCI - SNGL USE

## (undated) DEVICE — ELECTRO LUBE IS A SINGLE PATIENT USE DEVICE THAT IS INTENDED TO BE USED ON ELECTROSURGICAL ELECTRODES TO REDUCE STICKING.: Brand: KEY SURGICAL ELECTRO LUBE

## (undated) DEVICE — SUTURE VCRL SZ 0 L27IN ABSRB VLT L26MM CT-2 1/2 CIR J334H

## (undated) DEVICE — MONOPOLAR CURVED SCISSORS: Brand: ENDOWRIST

## (undated) DEVICE — BLADELESS OBTURATOR: Brand: WECK VISTA

## (undated) DEVICE — LAP CHOLE: Brand: MEDLINE INDUSTRIES, INC.

## (undated) DEVICE — DRAPE,TOP,102X53,STERILE: Brand: MEDLINE

## (undated) DEVICE — BLADE CLIPPER GEN PURP NS

## (undated) DEVICE — PERI-PAD,MODERATE: Brand: CURITY

## (undated) DEVICE — AMD ANTIMICROBIAL GAUZE SPONGES 8 PLY USP TYPE VII: Brand: CURITY

## (undated) DEVICE — AIRSEAL BIFURCATED FILTERED TUBESET WITH ACTIVATED CHARCOAL FILTER: Brand: AIRSEAL

## (undated) DEVICE — CATHETER URETH 16FR BLLN 5CC SIL ALLY W/ SIL HYDRGEL 2 W F

## (undated) DEVICE — DRAPE ROBOTIC CAM ARM DISP ENDOWRIST DA VINCI SI

## (undated) DEVICE — FILTER SMK EVAC FLO CLR MEGADYNE

## (undated) DEVICE — DRAPE,UNDERBUTTOCKS,PCH,STERILE: Brand: MEDLINE

## (undated) DEVICE — AIRSEAL 8 MM CANNULA CAP AND OBTURATOR WITH BLADELESS OPTICAL TIP COMPATIBLE WITH INTUITIVE DA VINCI XI AND DA VINCI X 8 MM INSTRUMENT CANNULA, STANDARD LENGTH: Brand: AIRSEAL

## (undated) DEVICE — CARDINAL HEALTH FLEXIBLE LIGHT HANDLE COVER: Brand: CARDINAL HEALTH

## (undated) DEVICE — AMD ANTIMICROBIAL GAUZE SPONGES,12 PLY USP TYPE VII, 0.2% POLYHEXAMETHYLENE BIGUANIDE HCI (PHMB): Brand: CURITY

## (undated) DEVICE — WARMER SCP STRL DISP

## (undated) DEVICE — SUTURE VCRL SZ 0 L27IN ABSRB UD L26MM CT-2 1/2 CIR J270H

## (undated) DEVICE — SOLUTION LACTATED RINGERS INJECTION USP

## (undated) DEVICE — DRAPE INSTR ARM ROBOTIC ENDOWRIST DA VINCI S

## (undated) DEVICE — 40585 XL ADVANCED TRENDELENBURG POSITIONING KIT: Brand: 40585 XL ADVANCED TRENDELENBURG POSITIONING KIT

## (undated) DEVICE — TROCAR: Brand: KII FIOS FIRST ENTRY

## (undated) DEVICE — SUTURE VCRL SZ 0 L36IN ABSRB UD L36MM CT-1 1/2 CIR J946H

## (undated) DEVICE — TRAY PREP DRY W/ PREM GLV 2 APPL 6 SPNG 2 UNDPD 1 OVERWRAP

## (undated) DEVICE — DRAPE ROBOTIC CAM HD DISP ENDOWRIST DA VINCI SI

## (undated) DEVICE — SOLUTION ANTIFOG VIS SYS CLEARIFY LAPSCP